# Patient Record
Sex: MALE | Race: BLACK OR AFRICAN AMERICAN | NOT HISPANIC OR LATINO | Employment: UNEMPLOYED | URBAN - METROPOLITAN AREA
[De-identification: names, ages, dates, MRNs, and addresses within clinical notes are randomized per-mention and may not be internally consistent; named-entity substitution may affect disease eponyms.]

---

## 2020-08-14 ENCOUNTER — HOSPITAL ENCOUNTER (EMERGENCY)
Facility: HOSPITAL | Age: 35
Discharge: HOME | End: 2020-08-14
Attending: EMERGENCY MEDICINE

## 2020-08-14 VITALS
OXYGEN SATURATION: 98 % | WEIGHT: 265 LBS | BODY MASS INDEX: 40.16 KG/M2 | HEIGHT: 68 IN | SYSTOLIC BLOOD PRESSURE: 149 MMHG | TEMPERATURE: 97.9 F | DIASTOLIC BLOOD PRESSURE: 114 MMHG | HEART RATE: 81 BPM | RESPIRATION RATE: 18 BRPM

## 2020-08-14 DIAGNOSIS — B30.9 ACUTE VIRAL CONJUNCTIVITIS OF BOTH EYES: Primary | ICD-10-CM

## 2020-08-14 PROCEDURE — 99284 EMERGENCY DEPT VISIT MOD MDM: CPT | Performed by: PHYSICIAN ASSISTANT

## 2020-08-14 PROCEDURE — 99282 EMERGENCY DEPT VISIT SF MDM: CPT

## 2020-08-14 RX ORDER — OFLOXACIN 3 MG/ML
1 SOLUTION/ DROPS OPHTHALMIC 4 TIMES DAILY
Qty: 1.4 ML | Refills: 0 | Status: SHIPPED | OUTPATIENT
Start: 2020-08-14 | End: 2020-08-21

## 2020-08-14 RX ORDER — KETOROLAC TROMETHAMINE 5 MG/ML
1 SOLUTION OPHTHALMIC 4 TIMES DAILY PRN
Qty: 3 ML | Refills: 0 | Status: SHIPPED | OUTPATIENT
Start: 2020-08-14 | End: 2020-08-19

## 2020-08-15 NOTE — ED NOTES
D/c reviewed with pt  Pt verbalized understanding and has no further questions at this time  Pt ambulatory off unit with steady gait       Aleksandr Client, ELLEN  08/14/20 2701

## 2020-08-15 NOTE — ED PROVIDER NOTES
History  Chief Complaint   Patient presents with    Eye Redness     x 2 days; pt reports having stye on left eye that eventually turned to full eye redness and itching     39-year-old male comes in today for evaluation of bilateral eye redness and swelling that started yesterday  Reports that started with a stye on his left medial upper eyelid and then it progressed to bilateral eye redness and itchiness  He reports discharge from left greater than right eye  He does not wear contacts  Does not have an eye doctor  None       History reviewed  No pertinent past medical history  History reviewed  No pertinent surgical history  History reviewed  No pertinent family history  I have reviewed and agree with the history as documented  E-Cigarette/Vaping     E-Cigarette/Vaping Substances     Social History     Tobacco Use    Smoking status: Current Every Day Smoker     Types: Cigarettes    Smokeless tobacco: Current User   Substance Use Topics    Alcohol use: Not on file    Drug use: Not on file       Review of Systems   Constitutional: Negative for fatigue and fever  HENT: Negative for ear pain, rhinorrhea and sore throat  Eyes: Positive for discharge, redness and itching  Negative for photophobia, pain and visual disturbance  Respiratory: Negative for cough and shortness of breath  Cardiovascular: Negative for chest pain  Gastrointestinal: Negative for abdominal pain, diarrhea, nausea and vomiting  Musculoskeletal: Negative for back pain  Neurological: Negative for headaches  Physical Exam  Physical Exam  Constitutional:       Appearance: He is well-developed  HENT:      Head: Normocephalic and atraumatic  Eyes:      General: Vision grossly intact  Left eye: Discharge present  Conjunctiva/sclera:      Right eye: Right conjunctiva is not injected  Left eye: Left conjunctiva is injected        Comments: Bilateral conjunctiva are erythematous, the medial canthus bilaterally are mildly swollen  There is watery yellow drainage from left eye  The left medial upper lid is more swollen and erythematous than the rest of the lid   Neck:      Musculoskeletal: Normal range of motion  Pulmonary:      Effort: Pulmonary effort is normal    Musculoskeletal: Normal range of motion  Skin:     General: Skin is warm and dry  Neurological:      Mental Status: He is alert and oriented to person, place, and time  Psychiatric:         Behavior: Behavior normal          Vital Signs  ED Triage Vitals [08/14/20 2120]   Temperature Pulse Respirations Blood Pressure SpO2   97 9 °F (36 6 °C) 81 18 (!) 149/114 98 %      Temp Source Heart Rate Source Patient Position - Orthostatic VS BP Location FiO2 (%)   Tympanic Monitor Sitting Left arm --      Pain Score       No Pain           Vitals:    08/14/20 2120   BP: (!) 149/114   Pulse: 81   Patient Position - Orthostatic VS: Sitting         Visual Acuity      ED Medications  Medications - No data to display    Diagnostic Studies  Results Reviewed     None                 No orders to display              Procedures  Procedures         ED Course                                             MDM      Disposition  Final diagnoses:   Acute viral conjunctivitis of both eyes     Time reflects when diagnosis was documented in both MDM as applicable and the Disposition within this note     Time User Action Codes Description Comment    8/14/2020  9:38 PM Ez Bryant Add [B30 9] Acute viral conjunctivitis of both eyes       ED Disposition     ED Disposition Condition Date/Time Comment    Discharge Stable Fri Aug 14, 2020  9:38 PM [facility] discharge to home/self care              Follow-up Information     Follow up With Specialties Details Why Contact Info    Houston Ortiz MD Ophthalmology Schedule an appointment as soon as possible for a visit   [place] [id number] Tai Munoz MD Ophthalmology Schedule an appointment as soon as possible for a visit   Sharon Pelletier [address]  [address] [id number] [phone]            Discharge Medication List as of 8/14/2020  9:42 PM      START taking these medications    Details   ketorolac (ACULAR) 0 5 % ophthalmic solution Administer 1 drop to both eyes 4 (four) times a day as needed (itchiness) for up to 5 days, Starting Fri 8/14/2020, Until Wed 8/19/2020, Print      ofloxacin (OCUFLOX) 0 3 % ophthalmic solution Administer 1 drop to both eyes 4 (four) times a day for 7 days, Starting Fri 8/14/2020, Until Fri 8/21/2020, Print           No discharge procedures on file      PDMP Review     None          ED Provider  Electronically Signed by           Beto Marsh PA-C  08/14/20 8333

## 2023-10-30 ENCOUNTER — HOSPITAL ENCOUNTER (EMERGENCY)
Facility: HOSPITAL | Age: 38
Discharge: HOME | End: 2023-10-30
Attending: EMERGENCY MEDICINE
Payer: COMMERCIAL

## 2023-10-30 VITALS
TEMPERATURE: 98.6 F | SYSTOLIC BLOOD PRESSURE: 178 MMHG | RESPIRATION RATE: 18 BRPM | HEART RATE: 97 BPM | DIASTOLIC BLOOD PRESSURE: 117 MMHG

## 2023-10-30 DIAGNOSIS — M54.9 MUSCULOSKELETAL BACK PAIN: Primary | ICD-10-CM

## 2023-10-30 PROCEDURE — 99282 EMERGENCY DEPT VISIT SF MDM: CPT

## 2023-10-30 PROCEDURE — 99284 EMERGENCY DEPT VISIT MOD MDM: CPT | Performed by: EMERGENCY MEDICINE

## 2023-10-30 PROCEDURE — 96372 THER/PROPH/DIAG INJ SC/IM: CPT

## 2023-10-30 RX ORDER — KETOROLAC TROMETHAMINE 30 MG/ML
15 INJECTION, SOLUTION INTRAMUSCULAR; INTRAVENOUS ONCE
Status: COMPLETED | OUTPATIENT
Start: 2023-10-30 | End: 2023-10-30

## 2023-10-30 RX ORDER — ACETAMINOPHEN 325 MG/1
975 TABLET ORAL ONCE
Status: COMPLETED | OUTPATIENT
Start: 2023-10-30 | End: 2023-10-30

## 2023-10-30 RX ORDER — NAPROXEN 500 MG/1
500 TABLET ORAL 2 TIMES DAILY WITH MEALS
Qty: 30 TABLET | Refills: 0 | Status: SHIPPED | OUTPATIENT
Start: 2023-10-30

## 2023-10-30 RX ORDER — LIDOCAINE 50 MG/G
1 PATCH TOPICAL ONCE
Status: DISCONTINUED | OUTPATIENT
Start: 2023-10-30 | End: 2023-10-30 | Stop reason: HOSPADM

## 2023-10-30 RX ADMIN — ACETAMINOPHEN 975 MG: 325 TABLET, FILM COATED ORAL at 12:56

## 2023-10-30 RX ADMIN — LIDOCAINE 1 PATCH: 50 PATCH CUTANEOUS at 12:55

## 2023-10-30 RX ADMIN — KETOROLAC TROMETHAMINE 15 MG: 30 INJECTION, SOLUTION INTRAMUSCULAR; INTRAVENOUS at 12:55

## 2023-10-30 NOTE — ED PROVIDER NOTES
History  Chief Complaint   Patient presents with    Back Pain     Lower back pain that radiates down legs since yesterday     HPI    Patient is a 43year old male with no significant PMHx, presenting to the ED for evaluation of lower back pain. Patient states that the back pain started 2 days ago, after he was moving a heavy bed. Patient did not sustain direct trauma to the back. He denies hearing a pop or snap. Patient states that he has since had pain across the lower back region, described as sharp, worse with bending movements. Patient states he has not tried anything for pain. Patient denies fevers, chills, saddle anesthesia, difficulty ambulating, urinary or bowel incontinence, radiating pain down the extremities, IVDA. Prior to Admission Medications   Prescriptions Last Dose Informant Patient Reported? Taking?   ketorolac (ACULAR) 0.5 % ophthalmic solution   No No   Sig: Administer 1 drop to both eyes 4 (four) times a day as needed (itchiness) for up to 5 days      Facility-Administered Medications: None       History reviewed. No pertinent past medical history. History reviewed. No pertinent surgical history. History reviewed. No pertinent family history. I have reviewed and agree with the history as documented. E-Cigarette/Vaping     E-Cigarette/Vaping Substances     Social History     Tobacco Use    Smoking status: Every Day     Types: Cigarettes    Smokeless tobacco: Current        Review of Systems   All other systems reviewed and are negative.       Physical Exam  ED Triage Vitals   Temperature Pulse Respirations Blood Pressure SpO2   10/30/23 1153 10/30/23 1153 10/30/23 1153 10/30/23 1155 --   98.6 °F (37 °C) 97 18 (!) 232/151       Temp Source Heart Rate Source Patient Position - Orthostatic VS BP Location FiO2 (%)   10/30/23 1153 10/30/23 1153 10/30/23 1314 10/30/23 1314 --   Temporal Monitor Sitting Left arm       Pain Score       10/30/23 1155       8             Orthostatic Vital Signs  Vitals:    10/30/23 1153 10/30/23 1155 10/30/23 1314   BP:  (!) 232/151 (!) 178/117   Pulse: 97     Patient Position - Orthostatic VS:   Sitting       Physical Exam  Vitals and nursing note reviewed. Constitutional:       General: He is not in acute distress. Appearance: Normal appearance. He is well-developed and normal weight. He is not ill-appearing or toxic-appearing. HENT:      Head: Normocephalic and atraumatic. Right Ear: External ear normal.      Left Ear: External ear normal.      Nose: Nose normal. No congestion. Mouth/Throat:      Mouth: Mucous membranes are moist.      Pharynx: Oropharynx is clear. Eyes:      Extraocular Movements: Extraocular movements intact. Conjunctiva/sclera: Conjunctivae normal.   Cardiovascular:      Rate and Rhythm: Normal rate and regular rhythm. Pulses: Normal pulses. Heart sounds: Normal heart sounds. No murmur heard. Pulmonary:      Effort: Pulmonary effort is normal. No respiratory distress. Breath sounds: Normal breath sounds. Abdominal:      Palpations: Abdomen is soft. Tenderness: There is no abdominal tenderness. Musculoskeletal:      Cervical back: Normal, normal range of motion and neck supple. Thoracic back: Normal.      Lumbar back: Tenderness (L and R lower lumbar area) present. No swelling, edema, deformity, spasms or bony tenderness. Normal range of motion. Skin:     General: Skin is warm and dry. Capillary Refill: Capillary refill takes less than 2 seconds. Neurological:      Mental Status: He is alert and oriented to person, place, and time. Cranial Nerves: No cranial nerve deficit. Sensory: No sensory deficit. Motor: No weakness.       Coordination: Coordination normal.      Gait: Gait normal.   Psychiatric:         Mood and Affect: Mood normal.         ED Medications  Medications   acetaminophen (TYLENOL) tablet 975 mg (975 mg Oral Given 10/30/23 1256)   ketorolac (TORADOL) injection 15 mg (15 mg Intramuscular Given 10/30/23 1255)       Diagnostic Studies  Results Reviewed       None                   No orders to display         Procedures  Procedures      ED Course       Patient is a 43year old male presenting to the ED for evaluation of lower back pain. Patient was moving a heavy bed before the pain started and believes that the pain is attributed to that. Has not tried any medications. On exam, patient has diffuse lumbar tenderness, made worse with bending movements. Ddx musculoskeletal strain, disc bulge. No suspicion for acute spinal cord dysfunction based on normal neuro examination. Will treat with Tylenol, Toradol, Lidocaine patch. Referral given for [facility]. Patient offered Robaxin, but declined. I gave oral return precautions for what to return for in addition to the written return precautions. The patient verbalized understanding of the discharge instructions and warnings that would necessitate return to the Emergency Department. I specifically highlighted areas of special concern regarding the written and verbal discharge instructions and return precautions. All questions were answered prior to discharge. Medical Decision Making  Risk  OTC drugs. Prescription drug management. Disposition  Final diagnoses:   Musculoskeletal back pain     Time reflects when diagnosis was documented in both MDM as applicable and the Disposition within this note       Time User Action Codes Description Comment    10/30/2023 12:48 PM Angus العراقي [M54.9] Musculoskeletal back pain           ED Disposition       ED Disposition   Discharge    Condition   Stable    Date/Time   Mon Oct 30, 2023 12:55 PM    Comment   Mor Fernando discharge to home/self care.                    Follow-up Information       Follow up With Specialties Details Why [address]    [phone]              Discharge Medication List as of 10/30/2023 12:56 PM        START taking these medications    Details   naproxen (Naprosyn) 500 mg tablet Take 1 tablet (500 mg total) by mouth 2 (two) times a day with meals, Starting Mon 10/30/2023, Normal           CONTINUE these medications which have NOT CHANGED    Details   ketorolac (ACULAR) 0.5 % ophthalmic solution Administer 1 drop to both eyes 4 (four) times a day as needed (itchiness) for up to 5 days, Starting Fri 8/14/2020, Until Wed 8/19/2020, Print               PDMP Review       None             ED Provider  Attending physically available and evaluated Gil Ontiveros. I managed the patient along with the ED Attending.     Electronically Signed by           Lor Jackson DO  10/30/23 8193

## 2023-10-30 NOTE — Clinical Note
Sourav Gee was seen and treated in our emergency department on 10/30/2023. No restrictions            Diagnosis: musculoskeletal back pain    Benitez  may return to work on return date. He may return on this date: 11/02/2023         If you have any questions or concerns, please don't hesitate to call.       Palomo Garrido, DO    ______________________________           _______________          _______________  Hospital Representative                              Date                                Time

## 2023-10-30 NOTE — DISCHARGE INSTRUCTIONS
Please take medications as prescribed. Follow up with [facility] as directed. Avoid heavy lifting. Gentle stretching advised. Purchase OTC Lidocaine patches. Return to the ED with any new/concerning issues.

## 2023-10-31 ENCOUNTER — TELEPHONE (OUTPATIENT)
Dept: PHYSICAL THERAPY | Facility: OTHER | Age: 38
End: 2023-10-31

## 2023-10-31 NOTE — TELEPHONE ENCOUNTER
Message left for patient regarding the referral entered for  [facility].     Contact information, hours of operation and VM instructions provided. Requested patient to CB to discuss [facility]SP services. This is first attempt to reach the patient. Referral deferred for f/u attempt per protocol.

## 2023-12-30 ENCOUNTER — HOSPITAL ENCOUNTER (INPATIENT)
Facility: HOSPITAL | Age: 38
LOS: 3 days | Discharge: HOME | DRG: 420 | End: 2024-01-02
Attending: EMERGENCY MEDICINE | Admitting: INTERNAL MEDICINE
Payer: COMMERCIAL

## 2023-12-30 DIAGNOSIS — I10 HYPERTENSION: ICD-10-CM

## 2023-12-30 DIAGNOSIS — E11.10 DKA (DIABETIC KETOACIDOSIS) (HCC): Primary | ICD-10-CM

## 2023-12-30 LAB
ALBUMIN SERPL BCP-MCNC: 4.4 G/DL (ref 3.5–5)
ALP SERPL-CCNC: 145 U/L (ref 34–104)
ALT SERPL W P-5'-P-CCNC: 28 U/L (ref 7–52)
ANION GAP SERPL CALCULATED.3IONS-SCNC: 15 MMOL/L
ANION GAP SERPL CALCULATED.3IONS-SCNC: 23 MMOL/L
ANION GAP SERPL CALCULATED.3IONS-SCNC: 7 MMOL/L
AST SERPL W P-5'-P-CCNC: 16 U/L (ref 13–39)
BACTERIA UR QL AUTO: ABNORMAL /HPF
BASE EX.OXY STD BLDV CALC-SCNC: 89.3 % (ref 60–80)
BASE EXCESS BLDV CALC-SCNC: -6.4 MMOL/L
BASOPHILS # BLD AUTO: 0.07 THOUSANDS/ÂΜL (ref 0–0.1)
BASOPHILS NFR BLD AUTO: 1 % (ref 0–1)
BETA-HYDROXYBUTYRATE: 5.8 MMOL/L
BILIRUB SERPL-MCNC: 1.02 MG/DL (ref 0.2–1)
BILIRUB UR QL STRIP: NEGATIVE
BUN SERPL-MCNC: 15 MG/DL (ref 5–25)
BUN SERPL-MCNC: 18 MG/DL (ref 5–25)
BUN SERPL-MCNC: 22 MG/DL (ref 5–25)
CALCIUM SERPL-MCNC: 10.2 MG/DL (ref 8.4–10.2)
CALCIUM SERPL-MCNC: 7.9 MG/DL (ref 8.4–10.2)
CALCIUM SERPL-MCNC: 8.4 MG/DL (ref 8.4–10.2)
CHLORIDE SERPL-SCNC: 101 MMOL/L (ref 96–108)
CHLORIDE SERPL-SCNC: 104 MMOL/L (ref 96–108)
CHLORIDE SERPL-SCNC: 92 MMOL/L (ref 96–108)
CLARITY UR: CLEAR
CO2 SERPL-SCNC: 19 MMOL/L (ref 21–32)
CO2 SERPL-SCNC: 19 MMOL/L (ref 21–32)
CO2 SERPL-SCNC: 26 MMOL/L (ref 21–32)
COLOR UR: ABNORMAL
CREAT SERPL-MCNC: 0.77 MG/DL (ref 0.6–1.3)
CREAT SERPL-MCNC: 0.85 MG/DL (ref 0.6–1.3)
CREAT SERPL-MCNC: 1.16 MG/DL (ref 0.6–1.3)
EOSINOPHIL # BLD AUTO: 0.01 THOUSAND/ÂΜL (ref 0–0.61)
EOSINOPHIL NFR BLD AUTO: 0 % (ref 0–6)
ERYTHROCYTE [DISTWIDTH] IN BLOOD BY AUTOMATED COUNT: 13.8 % (ref 11.6–15.1)
EST. AVERAGE GLUCOSE BLD GHB EST-MCNC: 367 MG/DL
FLUAV RNA RESP QL NAA+PROBE: NEGATIVE
FLUBV RNA RESP QL NAA+PROBE: NEGATIVE
GFR SERPL CREATININE-BSD FRML MDRD: 109 ML/MIN/1.73SQ M
GFR SERPL CREATININE-BSD FRML MDRD: 114 ML/MIN/1.73SQ M
GFR SERPL CREATININE-BSD FRML MDRD: 78 ML/MIN/1.73SQ M
GLUCOSE SERPL-MCNC: 159 MG/DL (ref 65–140)
GLUCOSE SERPL-MCNC: 164 MG/DL (ref 65–140)
GLUCOSE SERPL-MCNC: 173 MG/DL (ref 65–140)
GLUCOSE SERPL-MCNC: 183 MG/DL (ref 65–140)
GLUCOSE SERPL-MCNC: 185 MG/DL (ref 65–140)
GLUCOSE SERPL-MCNC: 210 MG/DL (ref 65–140)
GLUCOSE SERPL-MCNC: 261 MG/DL (ref 65–140)
GLUCOSE SERPL-MCNC: 262 MG/DL (ref 65–140)
GLUCOSE SERPL-MCNC: 282 MG/DL (ref 65–140)
GLUCOSE SERPL-MCNC: 364 MG/DL (ref 65–140)
GLUCOSE SERPL-MCNC: 468 MG/DL (ref 65–140)
GLUCOSE SERPL-MCNC: 579 MG/DL (ref 65–140)
GLUCOSE SERPL-MCNC: >500 MG/DL (ref 65–140)
GLUCOSE UR STRIP-MCNC: ABNORMAL MG/DL
GRAN CASTS #/AREA URNS LPF: ABNORMAL /[LPF]
HBA1C MFR BLD: 14.4 %
HCO3 BLDV-SCNC: 18.1 MMOL/L (ref 24–30)
HCT VFR BLD AUTO: 44.6 % (ref 36.5–49.3)
HGB BLD-MCNC: 14.5 G/DL (ref 12–17)
HGB UR QL STRIP.AUTO: ABNORMAL
HYALINE CASTS #/AREA URNS LPF: ABNORMAL /LPF
IMM GRANULOCYTES # BLD AUTO: 0.05 THOUSAND/UL (ref 0–0.2)
IMM GRANULOCYTES NFR BLD AUTO: 0 % (ref 0–2)
KETONES UR STRIP-MCNC: ABNORMAL MG/DL
LEUKOCYTE ESTERASE UR QL STRIP: ABNORMAL
LYMPHOCYTES # BLD AUTO: 1.99 THOUSANDS/ÂΜL (ref 0.6–4.47)
LYMPHOCYTES NFR BLD AUTO: 18 % (ref 14–44)
MAGNESIUM SERPL-MCNC: 1.9 MG/DL (ref 1.9–2.7)
MAGNESIUM SERPL-MCNC: 1.9 MG/DL (ref 1.9–2.7)
MAGNESIUM SERPL-MCNC: 2.1 MG/DL (ref 1.9–2.7)
MCH RBC QN AUTO: 28.2 PG (ref 26.8–34.3)
MCHC RBC AUTO-ENTMCNC: 32.5 G/DL (ref 31.4–37.4)
MCV RBC AUTO: 87 FL (ref 82–98)
MONOCYTES # BLD AUTO: 0.55 THOUSAND/ÂΜL (ref 0.17–1.22)
MONOCYTES NFR BLD AUTO: 5 % (ref 4–12)
MUCOUS THREADS UR QL AUTO: ABNORMAL
NEUTROPHILS # BLD AUTO: 8.72 THOUSANDS/ÂΜL (ref 1.85–7.62)
NEUTS SEG NFR BLD AUTO: 76 % (ref 43–75)
NITRITE UR QL STRIP: NEGATIVE
NON-SQ EPI CELLS URNS QL MICRO: ABNORMAL /HPF
NRBC BLD AUTO-RTO: 0 /100 WBCS
O2 CT BLDV-SCNC: 19.8 ML/DL
PCO2 BLDV: 33.7 MM HG (ref 42–50)
PH BLDV: 7.35 [PH] (ref 7.3–7.4)
PH UR STRIP.AUTO: 5 [PH]
PHOSPHATE SERPL-MCNC: 2.7 MG/DL (ref 2.7–4.5)
PHOSPHATE SERPL-MCNC: 3.6 MG/DL (ref 2.7–4.5)
PHOSPHATE SERPL-MCNC: 6.6 MG/DL (ref 2.7–4.5)
PO2 BLDV: 67.4 MM HG (ref 35–45)
POTASSIUM SERPL-SCNC: 3.5 MMOL/L (ref 3.5–5.3)
POTASSIUM SERPL-SCNC: 3.7 MMOL/L (ref 3.5–5.3)
POTASSIUM SERPL-SCNC: 4.8 MMOL/L (ref 3.5–5.3)
PROT SERPL-MCNC: 7.8 G/DL (ref 6.4–8.4)
PROT UR STRIP-MCNC: ABNORMAL MG/DL
RBC # BLD AUTO: 5.15 MILLION/UL (ref 3.88–5.62)
RBC #/AREA URNS AUTO: ABNORMAL /HPF
RSV RNA RESP QL NAA+PROBE: POSITIVE
SARS-COV-2 RNA RESP QL NAA+PROBE: NEGATIVE
SODIUM SERPL-SCNC: 134 MMOL/L (ref 135–147)
SODIUM SERPL-SCNC: 135 MMOL/L (ref 135–147)
SODIUM SERPL-SCNC: 137 MMOL/L (ref 135–147)
SP GR UR STRIP.AUTO: 1.03 (ref 1–1.03)
UROBILINOGEN UR STRIP-ACNC: <2 MG/DL
WBC # BLD AUTO: 11.39 THOUSAND/UL (ref 4.31–10.16)
WBC #/AREA URNS AUTO: ABNORMAL /HPF

## 2023-12-30 PROCEDURE — 82948 REAGENT STRIP/BLOOD GLUCOSE: CPT

## 2023-12-30 PROCEDURE — 85025 COMPLETE CBC W/AUTO DIFF WBC: CPT

## 2023-12-30 PROCEDURE — 83036 HEMOGLOBIN GLYCOSYLATED A1C: CPT | Performed by: STUDENT IN AN ORGANIZED HEALTH CARE EDUCATION/TRAINING PROGRAM

## 2023-12-30 PROCEDURE — 99284 EMERGENCY DEPT VISIT MOD MDM: CPT

## 2023-12-30 PROCEDURE — 84100 ASSAY OF PHOSPHORUS: CPT | Performed by: STUDENT IN AN ORGANIZED HEALTH CARE EDUCATION/TRAINING PROGRAM

## 2023-12-30 PROCEDURE — 82805 BLOOD GASES W/O2 SATURATION: CPT

## 2023-12-30 PROCEDURE — 93005 ELECTROCARDIOGRAM TRACING: CPT

## 2023-12-30 PROCEDURE — 83735 ASSAY OF MAGNESIUM: CPT | Performed by: INTERNAL MEDICINE

## 2023-12-30 PROCEDURE — 51798 US URINE CAPACITY MEASURE: CPT

## 2023-12-30 PROCEDURE — 96375 TX/PRO/DX INJ NEW DRUG ADDON: CPT

## 2023-12-30 PROCEDURE — 96365 THER/PROPH/DIAG IV INF INIT: CPT

## 2023-12-30 PROCEDURE — 80048 BASIC METABOLIC PNL TOTAL CA: CPT | Performed by: STUDENT IN AN ORGANIZED HEALTH CARE EDUCATION/TRAINING PROGRAM

## 2023-12-30 PROCEDURE — 83735 ASSAY OF MAGNESIUM: CPT

## 2023-12-30 PROCEDURE — 84100 ASSAY OF PHOSPHORUS: CPT | Performed by: INTERNAL MEDICINE

## 2023-12-30 PROCEDURE — 81001 URINALYSIS AUTO W/SCOPE: CPT

## 2023-12-30 PROCEDURE — 83735 ASSAY OF MAGNESIUM: CPT | Performed by: STUDENT IN AN ORGANIZED HEALTH CARE EDUCATION/TRAINING PROGRAM

## 2023-12-30 PROCEDURE — 80053 COMPREHEN METABOLIC PANEL: CPT

## 2023-12-30 PROCEDURE — 99291 CRITICAL CARE FIRST HOUR: CPT | Performed by: EMERGENCY MEDICINE

## 2023-12-30 PROCEDURE — 99223 1ST HOSP IP/OBS HIGH 75: CPT | Performed by: INTERNAL MEDICINE

## 2023-12-30 PROCEDURE — 84100 ASSAY OF PHOSPHORUS: CPT

## 2023-12-30 PROCEDURE — 36415 COLL VENOUS BLD VENIPUNCTURE: CPT

## 2023-12-30 PROCEDURE — 0241U HB NFCT DS VIR RESP RNA 4 TRGT: CPT

## 2023-12-30 PROCEDURE — 82010 KETONE BODYS QUAN: CPT

## 2023-12-30 PROCEDURE — 80048 BASIC METABOLIC PNL TOTAL CA: CPT | Performed by: INTERNAL MEDICINE

## 2023-12-30 RX ORDER — ACETAMINOPHEN 325 MG/1
650 TABLET ORAL EVERY 6 HOURS PRN
Status: DISCONTINUED | OUTPATIENT
Start: 2023-12-30 | End: 2024-01-02 | Stop reason: HOSPADM

## 2023-12-30 RX ORDER — ENOXAPARIN SODIUM 100 MG/ML
40 INJECTION SUBCUTANEOUS DAILY
Status: DISCONTINUED | OUTPATIENT
Start: 2023-12-30 | End: 2024-01-02 | Stop reason: HOSPADM

## 2023-12-30 RX ORDER — SODIUM CHLORIDE, SODIUM GLUCONATE, SODIUM ACETATE, POTASSIUM CHLORIDE, MAGNESIUM CHLORIDE, SODIUM PHOSPHATE, DIBASIC, AND POTASSIUM PHOSPHATE .53; .5; .37; .037; .03; .012; .00082 G/100ML; G/100ML; G/100ML; G/100ML; G/100ML; G/100ML; G/100ML
1000 INJECTION, SOLUTION INTRAVENOUS ONCE
Status: COMPLETED | OUTPATIENT
Start: 2023-12-30 | End: 2023-12-30

## 2023-12-30 RX ORDER — CHLORHEXIDINE GLUCONATE ORAL RINSE 1.2 MG/ML
15 SOLUTION DENTAL EVERY 12 HOURS SCHEDULED
Status: DISCONTINUED | OUTPATIENT
Start: 2023-12-30 | End: 2024-01-02

## 2023-12-30 RX ORDER — POTASSIUM CHLORIDE 20 MEQ/1
40 TABLET, EXTENDED RELEASE ORAL ONCE
Status: COMPLETED | OUTPATIENT
Start: 2023-12-30 | End: 2023-12-30

## 2023-12-30 RX ORDER — DEXTROSE, SODIUM CHLORIDE, SODIUM LACTATE, POTASSIUM CHLORIDE, AND CALCIUM CHLORIDE 5; .6; .31; .03; .02 G/100ML; G/100ML; G/100ML; G/100ML; G/100ML
100 INJECTION, SOLUTION INTRAVENOUS CONTINUOUS
Status: DISCONTINUED | OUTPATIENT
Start: 2023-12-30 | End: 2023-12-31

## 2023-12-30 RX ORDER — SODIUM CHLORIDE, SODIUM GLUCONATE, SODIUM ACETATE, POTASSIUM CHLORIDE, MAGNESIUM CHLORIDE, SODIUM PHOSPHATE, DIBASIC, AND POTASSIUM PHOSPHATE .53; .5; .37; .037; .03; .012; .00082 G/100ML; G/100ML; G/100ML; G/100ML; G/100ML; G/100ML; G/100ML
250 INJECTION, SOLUTION INTRAVENOUS CONTINUOUS
Status: DISCONTINUED | OUTPATIENT
Start: 2023-12-30 | End: 2023-12-30

## 2023-12-30 RX ORDER — LANOLIN ALCOHOL/MO/W.PET/CERES
800 CREAM (GRAM) TOPICAL ONCE
Status: COMPLETED | OUTPATIENT
Start: 2023-12-30 | End: 2023-12-30

## 2023-12-30 RX ADMIN — SODIUM CHLORIDE, SODIUM GLUCONATE, SODIUM ACETATE, POTASSIUM CHLORIDE, MAGNESIUM CHLORIDE, SODIUM PHOSPHATE, DIBASIC, AND POTASSIUM PHOSPHATE 250 ML/HR: .53; .5; .37; .037; .03; .012; .00082 INJECTION, SOLUTION INTRAVENOUS at 15:50

## 2023-12-30 RX ADMIN — SODIUM CHLORIDE 18 UNITS/HR: 9 INJECTION, SOLUTION INTRAVENOUS at 13:34

## 2023-12-30 RX ADMIN — POTASSIUM CHLORIDE 40 MEQ: 1500 TABLET, EXTENDED RELEASE ORAL at 22:04

## 2023-12-30 RX ADMIN — SODIUM CHLORIDE, SODIUM GLUCONATE, SODIUM ACETATE, POTASSIUM CHLORIDE, MAGNESIUM CHLORIDE, SODIUM PHOSPHATE, DIBASIC, AND POTASSIUM PHOSPHATE 250 ML/HR: .53; .5; .37; .037; .03; .012; .00082 INJECTION, SOLUTION INTRAVENOUS at 20:02

## 2023-12-30 RX ADMIN — MAGNESIUM OXIDE TAB 400 MG (241.3 MG ELEMENTAL MG) 800 MG: 400 (241.3 MG) TAB at 22:04

## 2023-12-30 RX ADMIN — SODIUM CHLORIDE, SODIUM GLUCONATE, SODIUM ACETATE, POTASSIUM CHLORIDE, MAGNESIUM CHLORIDE, SODIUM PHOSPHATE, DIBASIC, AND POTASSIUM PHOSPHATE 1000 ML: .53; .5; .37; .037; .03; .012; .00082 INJECTION, SOLUTION INTRAVENOUS at 14:27

## 2023-12-30 RX ADMIN — CHLORHEXIDINE GLUCONATE 15 ML: 1.2 SOLUTION ORAL at 16:51

## 2023-12-30 RX ADMIN — SODIUM CHLORIDE, SODIUM GLUCONATE, SODIUM ACETATE, POTASSIUM CHLORIDE, MAGNESIUM CHLORIDE, SODIUM PHOSPHATE, DIBASIC, AND POTASSIUM PHOSPHATE 1000 ML: .53; .5; .37; .037; .03; .012; .00082 INJECTION, SOLUTION INTRAVENOUS at 12:03

## 2023-12-30 RX ADMIN — SODIUM CHLORIDE, SODIUM GLUCONATE, SODIUM ACETATE, POTASSIUM CHLORIDE, MAGNESIUM CHLORIDE, SODIUM PHOSPHATE, DIBASIC, AND POTASSIUM PHOSPHATE 1000 ML: .53; .5; .37; .037; .03; .012; .00082 INJECTION, SOLUTION INTRAVENOUS at 12:02

## 2023-12-30 RX ADMIN — SODIUM CHLORIDE 12 UNITS/HR: 9 INJECTION, SOLUTION INTRAVENOUS at 19:04

## 2023-12-30 RX ADMIN — DEXTROSE, SODIUM CHLORIDE, SODIUM LACTATE, POTASSIUM CHLORIDE, AND CALCIUM CHLORIDE 250 ML/HR: 5; .6; .31; .03; .02 INJECTION, SOLUTION INTRAVENOUS at 17:56

## 2023-12-30 NOTE — ED ATTENDING ATTESTATION
12/30/2023  I, Luis Sanchez MD, saw and evaluated the patient. I have discussed the patient with the resident/non-physician practitioner and agree with the resident's/non-physician practitioner's findings, Plan of Care, and MDM as documented in the resident's/non-physician practitioner's note, except where noted. All available labs and Radiology studies were reviewed.  I was present for key portions of any procedure(s) performed by the resident/non-physician practitioner and I was immediately available to provide assistance.       At this point I agree with the current assessment done in the Emergency Department.  I have conducted an independent evaluation of this patient a history and physical is as follows:  Here with  thirst  and poly uria   Feels bloated  and weak     No known dm   Exam   NAD   anicteric    dry mm   Neck supple   lungs clear heart rrr no m abd soft nt nd pos bs  ext normal   Imp   new onset  DM   r/o DKA    ED Course   Labs consistent with mild DKA new onset diabetes  Bicarb is 19 anion gap is 23   pH 7.34  pco2 33  Blood sugar greater than 500  IV fluid bolus x 2 L  IV insulin infusion initiated  Critical Care Time  Procedures    The patient presented with a condition in which there was a high probability of imminent or life-threatening deterioration, and critical care services (excluding separately billable procedures and total teaching time ) total 35  minutes

## 2023-12-30 NOTE — LETTER
[facility] [address]  [address]  [place] [address]  Dept: [phone]    January 2, 2024     Patient: Benitez White   YOB: 1985   Date of Visit: 12/30/2023       To Whom it May Concern:    Benitez White is under my professional care. He was seen in the hospital from 12/30/2023 to 01/02/24. He may return to work on 1/6/2024 with the following limitations - time for blood glucose checks and insulin administration.    If you have any questions or concerns, please don't hesitate to call.         Sincerely,          Eileen Archer MD

## 2023-12-30 NOTE — H&P
"H&P Exam - Critical Care   Benitez White 38 y.o. male MRN: [id number]  Unit/Bed#: [facility] Encounter: [id number]      -------------------------------------------------------------------------------------------------------------  Chief Complaint: Fatigue    History of Present Illness   HX and PE limited by: N/A  Benitez White is a 38 y.o. male without significant PMHx who presents with 1 week of worsening general weakness. He also complains of urinary frequency, increased thirst, and decreased appetite. Pt states they symptoms have been progressively worsening over the past week, he can no longer tolerated the urinary frequency. He has been urinating ~ every hour. No recent illness. No family history of DM. Pt smoked 1 pack of cigarettes per day for \"most of his life,\" quit smoking and vaping ~ 3 months ago. Does not drink alcohol daily.     History obtained from the patient.  -------------------------------------------------------------------------------------------------------------  Assessment and Plan:    Neuro:   Diagnosis: No active issues     CV:   Diagnosis: No active issues       Pulm:  Diagnosis: No active issues       GI:   Diagnosis: No active issues       :   Diagnosis: Increased urinary frequency, 2/2 DKA   UA with elevated glucose and ketones, nitrite/leukocyte neg  Treat DKA       F/E/N:   F: 3L isolyte, will transition to D5 LR when BS below 250   E: q 4 BMP, replete K<4, Mag <2, Phos <3   N: NPO       Heme/Onc:   Diagnosis: DVT px  Plan: Lovenox       Endo:   Diagnosis: DKA, b-hydroxybutyrate 5.8  Plan: s/p fluid boluses  Insulin drip  Replete lytes  d5LR when BS < 250  Endocrine consult: new diabetic, DKA     ID:   Diagnosis: No active issues       MSK/Skin:   Diagnosis: No active issues   Plan: OOB         Disposition: Admit to Stepdown Level 1  Code Status: Level 1 - Full Code  --------------------------------------------------------------------------------------------------------------  Review of " Systems   Constitutional:  Positive for appetite change and fatigue.   Endocrine: Positive for polydipsia and polyuria.   Genitourinary:  Positive for frequency.   All other systems reviewed and are negative.      A 12-point, complete review of systems was reviewed and negative except as stated above     Physical Exam  Vitals reviewed.   Constitutional:       General: He is not in acute distress.     Appearance: Normal appearance. He is obese.   HENT:      Head: Normocephalic and atraumatic.   Eyes:      Extraocular Movements: Extraocular movements intact.      Conjunctiva/sclera: Conjunctivae normal.      Pupils: Pupils are equal, round, and reactive to light.   Cardiovascular:      Rate and Rhythm: Normal rate and regular rhythm.      Pulses: Normal pulses.      Heart sounds: Normal heart sounds.   Pulmonary:      Effort: Pulmonary effort is normal.      Breath sounds: Normal breath sounds.   Abdominal:      Palpations: Abdomen is soft.      Tenderness: There is no abdominal tenderness.   Musculoskeletal:         General: Normal range of motion.      Cervical back: Normal range of motion.   Skin:     General: Skin is warm and dry.      Capillary Refill: Capillary refill takes less than 2 seconds.   Neurological:      General: No focal deficit present.      Mental Status: He is alert and oriented to person, place, and time.         --------------------------------------------------------------------------------------------------------------  Vitals:   Vitals:    12/30/23 1029 12/30/23 1237 12/30/23 1345   BP: (!) 174/103 148/88 (!) 180/89   BP Location: Left arm Left arm Left arm   Pulse: 99 79 70   Resp: 17 19 20   Temp: 97.6 °F (36.4 °C)     TempSrc: Oral     SpO2: 100% 100% 100%     Temp  Min: 97.6 °F (36.4 °C)  Max: 97.6 °F (36.4 °C)        There is no height or weight on file to calculate BMI.  N/A    Laboratory and Diagnostics:  Results from last 7 days   Lab Units 12/30/23  1137   WBC Thousand/uL 11.39*    HEMOGLOBIN g/dL 14.5   HEMATOCRIT % 44.6   NEUTROS PCT % 76*   MONOS PCT % 5   EOS PCT % 0     Results from last 7 days   Lab Units 12/30/23  1148   SODIUM mmol/L 134*   POTASSIUM mmol/L 4.8   CHLORIDE mmol/L 92*   CO2 mmol/L 19*   ANION GAP mmol/L 23   BUN mg/dL 22   CREATININE mg/dL 1.16   CALCIUM mg/dL 10.2   GLUCOSE RANDOM mg/dL 579*   ALT U/L 28   AST U/L 16   ALK PHOS U/L 145*   ALBUMIN g/dL 4.4   TOTAL BILIRUBIN mg/dL 1.02*     Results from last 7 days   Lab Units 12/30/23  1148   MAGNESIUM mg/dL 2.1   PHOSPHORUS mg/dL 6.6*                   ABG:    VBG:  Results from last 7 days   Lab Units 12/30/23  1148   PH SULAIMAN  7.348   PCO2 SULAIMAN mm Hg 33.7*   PO2 SULAIMAN mm Hg 67.4*   HCO3 SULAIMAN mmol/L 18.1*   BASE EXC SULAIMAN mmol/L -6.4           Micro:        EKG: No EKG  Imaging: No new imaging      Historical Information   History reviewed. No pertinent past medical history.  History reviewed. No pertinent surgical history.  Social History   Social History     Substance and Sexual Activity   Alcohol Use Not Currently     Social History     Substance and Sexual Activity   Drug Use Not Currently    Types: Marijuana     Social History     Tobacco Use   Smoking Status Every Day    Types: Cigarettes   Smokeless Tobacco Current     Exercise History:   Family History:   History reviewed. No pertinent family history.  I have reviewed this patient's family history and commented on sigificant items within the HPI      Medications:  Current Facility-Administered Medications   Medication Dose Route Frequency    chlorhexidine (PERIDEX) 0.12 % oral rinse 15 mL  15 mL Mouth/Throat Q12H [facility]    dextrose 5 % in lactated Ringer's infusion  250 mL/hr Intravenous Continuous    enoxaparin (LOVENOX) subcutaneous injection 40 mg  40 mg Subcutaneous Daily    insulin regular (HumuLIN R,NovoLIN R) 1 Units/mL in sodium chloride 0.9 % 100 mL infusion  0.3-21 Units/hr Intravenous Titrated    multi-electrolyte (ISOLYTE-S PH 7.4) bolus 1,000 mL  1,000 mL  "Intravenous Once    multi-electrolyte (PLASMALYTE-A/ISOLYTE-S PH 7.4) IV solution  250 mL/hr Intravenous Continuous     Home medications:  Prior to Admission Medications   Prescriptions Last Dose Informant Patient Reported? Taking?   ketorolac (ACULAR) 0.5 % ophthalmic solution   No No   Sig: Administer 1 drop to both eyes 4 (four) times a day as needed (itchiness) for up to 5 days   naproxen (Naprosyn) 500 mg tablet   No No   Sig: Take 1 tablet (500 mg total) by mouth 2 (two) times a day with meals      Facility-Administered Medications: None     Allergies:  Allergies   Allergen Reactions    Nuts - Food Allergy      Cashews     ------------------------------------------------------------------------------------------------------------  Advance Directive and Living Will:      Power of :    POLST:    ------------------------------------------------------------------------------------------------------------  Anticipated Length of Stay is > 2 midnights    Care Time Delivered: 40 min    Jarek Hadley MD        Portions of the record may have been created with voice recognition software.  Occasional wrong word or \"sound a like\" substitutions may have occurred due to the inherent limitations of voice recognition software.  Read the chart carefully and recognize, using context, where substitutions have occurred   "

## 2023-12-31 ENCOUNTER — APPOINTMENT (INPATIENT)
Dept: NON INVASIVE DIAGNOSTICS | Facility: HOSPITAL | Age: 38
DRG: 420 | End: 2023-12-31
Payer: COMMERCIAL

## 2023-12-31 PROBLEM — E87.29 INCREASED ANION GAP METABOLIC ACIDOSIS: Status: ACTIVE | Noted: 2023-12-31

## 2023-12-31 PROBLEM — I10 HYPERTENSION: Status: ACTIVE | Noted: 2023-12-31

## 2023-12-31 PROBLEM — E11.10 DKA (DIABETIC KETOACIDOSIS) (HCC): Status: ACTIVE | Noted: 2023-12-31

## 2023-12-31 PROBLEM — B33.8 RSV (RESPIRATORY SYNCYTIAL VIRUS INFECTION): Status: ACTIVE | Noted: 2023-12-31

## 2023-12-31 LAB
2HR DELTA HS TROPONIN: -1 NG/L
4HR DELTA HS TROPONIN: -5 NG/L
ALBUMIN SERPL BCP-MCNC: 3.2 G/DL (ref 3.5–5)
ALP SERPL-CCNC: 85 U/L (ref 34–104)
ALT SERPL W P-5'-P-CCNC: 20 U/L (ref 7–52)
ANION GAP SERPL CALCULATED.3IONS-SCNC: 7 MMOL/L
ANION GAP SERPL CALCULATED.3IONS-SCNC: 8 MMOL/L
AORTIC ROOT: 3.3 CM
APICAL FOUR CHAMBER EJECTION FRACTION: 62 %
ASCENDING AORTA: 2.9 CM
AST SERPL W P-5'-P-CCNC: 19 U/L (ref 13–39)
BILIRUB SERPL-MCNC: 1.11 MG/DL (ref 0.2–1)
BUN SERPL-MCNC: 13 MG/DL (ref 5–25)
BUN SERPL-MCNC: 13 MG/DL (ref 5–25)
CALCIUM ALBUM COR SERPL-MCNC: 9.1 MG/DL (ref 8.3–10.1)
CALCIUM SERPL-MCNC: 8.3 MG/DL (ref 8.4–10.2)
CALCIUM SERPL-MCNC: 8.5 MG/DL (ref 8.4–10.2)
CARDIAC TROPONIN I PNL SERPL HS: 48 NG/L
CARDIAC TROPONIN I PNL SERPL HS: 52 NG/L
CARDIAC TROPONIN I PNL SERPL HS: 53 NG/L
CHLORIDE SERPL-SCNC: 103 MMOL/L (ref 96–108)
CHLORIDE SERPL-SCNC: 104 MMOL/L (ref 96–108)
CHOLEST SERPL-MCNC: 102 MG/DL
CO2 SERPL-SCNC: 25 MMOL/L (ref 21–32)
CO2 SERPL-SCNC: 26 MMOL/L (ref 21–32)
CREAT SERPL-MCNC: 0.72 MG/DL (ref 0.6–1.3)
CREAT SERPL-MCNC: 0.77 MG/DL (ref 0.6–1.3)
E WAVE DECELERATION TIME: 201 MS
E/A RATIO: 1.1
ERYTHROCYTE [DISTWIDTH] IN BLOOD BY AUTOMATED COUNT: 13.8 % (ref 11.6–15.1)
FRACTIONAL SHORTENING: 28 (ref 28–44)
GFR SERPL CREATININE-BSD FRML MDRD: 114 ML/MIN/1.73SQ M
GFR SERPL CREATININE-BSD FRML MDRD: 117 ML/MIN/1.73SQ M
GLUCOSE SERPL-MCNC: 101 MG/DL (ref 65–140)
GLUCOSE SERPL-MCNC: 116 MG/DL (ref 65–140)
GLUCOSE SERPL-MCNC: 127 MG/DL (ref 65–140)
GLUCOSE SERPL-MCNC: 137 MG/DL (ref 65–140)
GLUCOSE SERPL-MCNC: 138 MG/DL (ref 65–140)
GLUCOSE SERPL-MCNC: 174 MG/DL (ref 65–140)
GLUCOSE SERPL-MCNC: 190 MG/DL (ref 65–140)
GLUCOSE SERPL-MCNC: 202 MG/DL (ref 65–140)
GLUCOSE SERPL-MCNC: 209 MG/DL (ref 65–140)
GLUCOSE SERPL-MCNC: 218 MG/DL (ref 65–140)
GLUCOSE SERPL-MCNC: 218 MG/DL (ref 65–140)
GLUCOSE SERPL-MCNC: 227 MG/DL (ref 65–140)
HCT VFR BLD AUTO: 34.8 % (ref 36.5–49.3)
HDLC SERPL-MCNC: 26 MG/DL
HGB BLD-MCNC: 11.5 G/DL (ref 12–17)
INTERVENTRICULAR SEPTUM IN DIASTOLE (PARASTERNAL SHORT AXIS VIEW): 1.3 CM
INTERVENTRICULAR SEPTUM: 1.3 CM (ref 0.6–1.1)
LAAS-AP2: 18 CM2
LAAS-AP4: 17.1 CM2
LDLC SERPL CALC-MCNC: 58 MG/DL (ref 0–100)
LEFT ATRIUM SIZE: 3.6 CM
LEFT ATRIUM VOLUME (MOD BIPLANE): 49 ML
LEFT ATRIUM VOLUME INDEX (MOD BIPLANE): 22.6 ML/M2
LEFT INTERNAL DIMENSION IN SYSTOLE: 3.3 CM (ref 2.1–4)
LEFT VENTRICULAR INTERNAL DIMENSION IN DIASTOLE: 4.6 CM (ref 3.5–6)
LEFT VENTRICULAR POSTERIOR WALL IN END DIASTOLE: 1.3 CM
LEFT VENTRICULAR STROKE VOLUME: 55 ML
LVSV (TEICH): 55 ML
MAGNESIUM SERPL-MCNC: 1.8 MG/DL (ref 1.9–2.7)
MAGNESIUM SERPL-MCNC: 1.9 MG/DL (ref 1.9–2.7)
MCH RBC QN AUTO: 28.8 PG (ref 26.8–34.3)
MCHC RBC AUTO-ENTMCNC: 33 G/DL (ref 31.4–37.4)
MCV RBC AUTO: 87 FL (ref 82–98)
MV E'TISSUE VEL-SEP: 6 CM/S
MV PEAK A VEL: 0.49 M/S
MV PEAK E VEL: 54 CM/S
MV STENOSIS PRESSURE HALF TIME: 58 MS
MV VALVE AREA P 1/2 METHOD: 3.79
NONHDLC SERPL-MCNC: 76 MG/DL
PHOSPHATE SERPL-MCNC: 2.7 MG/DL (ref 2.7–4.5)
PHOSPHATE SERPL-MCNC: 2.8 MG/DL (ref 2.7–4.5)
PLATELET # BLD AUTO: 210 THOUSANDS/UL (ref 149–390)
PMV BLD AUTO: 13.3 FL (ref 8.9–12.7)
POTASSIUM SERPL-SCNC: 3.6 MMOL/L (ref 3.5–5.3)
POTASSIUM SERPL-SCNC: 3.7 MMOL/L (ref 3.5–5.3)
PROT SERPL-MCNC: 5.5 G/DL (ref 6.4–8.4)
RBC # BLD AUTO: 3.99 MILLION/UL (ref 3.88–5.62)
RIGHT ATRIUM AREA SYSTOLE A4C: 15.5 CM2
RIGHT VENTRICLE ID DIMENSION: 3.3 CM
SL CV LEFT ATRIUM LENGTH A2C: 5.1 CM
SL CV LV EF: 62
SL CV PED ECHO LEFT VENTRICLE DIASTOLIC VOLUME (MOD BIPLANE) 2D: 99 ML
SL CV PED ECHO LEFT VENTRICLE SYSTOLIC VOLUME (MOD BIPLANE) 2D: 44 ML
SODIUM SERPL-SCNC: 136 MMOL/L (ref 135–147)
SODIUM SERPL-SCNC: 137 MMOL/L (ref 135–147)
TRICUSPID ANNULAR PLANE SYSTOLIC EXCURSION: 2.4 CM
TRIGL SERPL-MCNC: 89 MG/DL
TSH SERPL DL<=0.05 MIU/L-ACNC: 1.48 UIU/ML (ref 0.45–4.5)
WBC # BLD AUTO: 10.25 THOUSAND/UL (ref 4.31–10.16)

## 2023-12-31 PROCEDURE — 83735 ASSAY OF MAGNESIUM: CPT | Performed by: STUDENT IN AN ORGANIZED HEALTH CARE EDUCATION/TRAINING PROGRAM

## 2023-12-31 PROCEDURE — 99255 IP/OBS CONSLTJ NEW/EST HI 80: CPT | Performed by: STUDENT IN AN ORGANIZED HEALTH CARE EDUCATION/TRAINING PROGRAM

## 2023-12-31 PROCEDURE — 80053 COMPREHEN METABOLIC PANEL: CPT | Performed by: INTERNAL MEDICINE

## 2023-12-31 PROCEDURE — 84100 ASSAY OF PHOSPHORUS: CPT | Performed by: STUDENT IN AN ORGANIZED HEALTH CARE EDUCATION/TRAINING PROGRAM

## 2023-12-31 PROCEDURE — 82948 REAGENT STRIP/BLOOD GLUCOSE: CPT

## 2023-12-31 PROCEDURE — 84484 ASSAY OF TROPONIN QUANT: CPT

## 2023-12-31 PROCEDURE — 84100 ASSAY OF PHOSPHORUS: CPT | Performed by: INTERNAL MEDICINE

## 2023-12-31 PROCEDURE — 93306 TTE W/DOPPLER COMPLETE: CPT | Performed by: INTERNAL MEDICINE

## 2023-12-31 PROCEDURE — 85027 COMPLETE CBC AUTOMATED: CPT | Performed by: STUDENT IN AN ORGANIZED HEALTH CARE EDUCATION/TRAINING PROGRAM

## 2023-12-31 PROCEDURE — 99233 SBSQ HOSP IP/OBS HIGH 50: CPT | Performed by: INTERNAL MEDICINE

## 2023-12-31 PROCEDURE — NC001 PR NO CHARGE: Performed by: INTERNAL MEDICINE

## 2023-12-31 PROCEDURE — 83735 ASSAY OF MAGNESIUM: CPT | Performed by: INTERNAL MEDICINE

## 2023-12-31 PROCEDURE — 80061 LIPID PANEL: CPT | Performed by: STUDENT IN AN ORGANIZED HEALTH CARE EDUCATION/TRAINING PROGRAM

## 2023-12-31 PROCEDURE — 93306 TTE W/DOPPLER COMPLETE: CPT

## 2023-12-31 PROCEDURE — 84443 ASSAY THYROID STIM HORMONE: CPT | Performed by: STUDENT IN AN ORGANIZED HEALTH CARE EDUCATION/TRAINING PROGRAM

## 2023-12-31 PROCEDURE — 93005 ELECTROCARDIOGRAM TRACING: CPT

## 2023-12-31 PROCEDURE — 80048 BASIC METABOLIC PNL TOTAL CA: CPT | Performed by: STUDENT IN AN ORGANIZED HEALTH CARE EDUCATION/TRAINING PROGRAM

## 2023-12-31 RX ORDER — INSULIN LISPRO 100 [IU]/ML
1-5 INJECTION, SOLUTION INTRAVENOUS; SUBCUTANEOUS
Status: DISCONTINUED | OUTPATIENT
Start: 2023-12-31 | End: 2024-01-02 | Stop reason: HOSPADM

## 2023-12-31 RX ORDER — GUAIFENESIN 600 MG/1
1200 TABLET, EXTENDED RELEASE ORAL EVERY 12 HOURS SCHEDULED
Status: DISCONTINUED | OUTPATIENT
Start: 2023-12-31 | End: 2024-01-02 | Stop reason: HOSPADM

## 2023-12-31 RX ORDER — INSULIN LISPRO 100 [IU]/ML
10 INJECTION, SOLUTION INTRAVENOUS; SUBCUTANEOUS
Status: DISCONTINUED | OUTPATIENT
Start: 2023-12-31 | End: 2024-01-02 | Stop reason: HOSPADM

## 2023-12-31 RX ORDER — POTASSIUM CHLORIDE 20 MEQ/1
40 TABLET, EXTENDED RELEASE ORAL ONCE
Status: COMPLETED | OUTPATIENT
Start: 2023-12-31 | End: 2023-12-31

## 2023-12-31 RX ORDER — INSULIN LISPRO 100 [IU]/ML
1-6 INJECTION, SOLUTION INTRAVENOUS; SUBCUTANEOUS
Status: DISCONTINUED | OUTPATIENT
Start: 2023-12-31 | End: 2024-01-02 | Stop reason: HOSPADM

## 2023-12-31 RX ORDER — INSULIN GLARGINE 100 [IU]/ML
30 INJECTION, SOLUTION SUBCUTANEOUS EVERY 24 HOURS
Status: DISCONTINUED | OUTPATIENT
Start: 2023-12-31 | End: 2024-01-01

## 2023-12-31 RX ORDER — LOSARTAN POTASSIUM 50 MG/1
50 TABLET ORAL DAILY
Status: DISCONTINUED | OUTPATIENT
Start: 2024-01-01 | End: 2024-01-02 | Stop reason: HOSPADM

## 2023-12-31 RX ADMIN — GUAIFENESIN 1200 MG: 600 TABLET, EXTENDED RELEASE ORAL at 01:42

## 2023-12-31 RX ADMIN — DEXTROSE, SODIUM CHLORIDE, SODIUM LACTATE, POTASSIUM CHLORIDE, AND CALCIUM CHLORIDE 100 ML/HR: 5; .6; .31; .03; .02 INJECTION, SOLUTION INTRAVENOUS at 01:40

## 2023-12-31 RX ADMIN — POTASSIUM CHLORIDE 40 MEQ: 1500 TABLET, EXTENDED RELEASE ORAL at 06:51

## 2023-12-31 RX ADMIN — INSULIN LISPRO 2 UNITS: 100 INJECTION, SOLUTION INTRAVENOUS; SUBCUTANEOUS at 17:10

## 2023-12-31 RX ADMIN — INSULIN LISPRO 10 UNITS: 100 INJECTION, SOLUTION INTRAVENOUS; SUBCUTANEOUS at 17:11

## 2023-12-31 RX ADMIN — GUAIFENESIN 1200 MG: 600 TABLET, EXTENDED RELEASE ORAL at 08:37

## 2023-12-31 RX ADMIN — INSULIN LISPRO 2 UNITS: 100 INJECTION, SOLUTION INTRAVENOUS; SUBCUTANEOUS at 21:37

## 2023-12-31 RX ADMIN — CHLORHEXIDINE GLUCONATE 15 ML: 1.2 SOLUTION ORAL at 08:36

## 2023-12-31 RX ADMIN — INSULIN LISPRO 10 UNITS: 100 INJECTION, SOLUTION INTRAVENOUS; SUBCUTANEOUS at 12:25

## 2023-12-31 RX ADMIN — ENOXAPARIN SODIUM 40 MG: 40 INJECTION SUBCUTANEOUS at 08:36

## 2023-12-31 NOTE — ASSESSMENT & PLAN NOTE
Anion gap originally 23, CO2 19. Most recent BMP with AG 8 and CO2 25. S/P 3 L Isolyte in the ED plus IVF per DKA protocol. Anion gap resolved on Insulin gtt.    Plan:  Continue to monitor

## 2023-12-31 NOTE — CONSULTS
Endocrinology Consultation  Benitez White 38 y.o. male MRN: [id number]  Unit/Bed#: MICU 04 Encounter: [id number]      Assessment/Plan     Assessment:  This is a 38 y.o.-year-old male with newly diagnosed diabetes, presenting with DKA.  Placed on insulin infusion, gap quickly closed, he has utilized 190 units of insulin in 22 hours.    Plan:  --Transition off of insulin infusion today  --Will give weight-based 30 units of Lantus now, and stop insulin infusion 2 hours after  --Start 10 units of lispro 3 times daily before every meal  --Start Algorithm 3/2 correction scale  --Dietitian evaluation  --Will need insulin and glucometer training  --Anticipate discharge on insulin  --Hypoglycemia protocol  --Will continue to follow and make adjustments as appropriate  -- Provided contact for endocrine fellow clinic, would recommend follow-up in 2 to 4 weeks  -- Will need antibody evaluation for type 1 diabetes, will place available inpatient orders now    CC: Diabetes Consult    History of Present Illness     HPI: Benitez White is a 38 y.o. year old male with history of obesity, minimal medical follow-up.  He has been feeling poorly for the last week, unable to tolerate food, frequent urination, urinary incontinence, significant fatigue, generalized weakness.  Has only been able to eat fruit and yogurt due to taste.    In ED, found hyperglycemic at 579, beta hydroxybutyrate positive at 5.8, anion gap elevated at 23.  He was also found RSV positive.  Admitted with DKA, found with A1c 14.4.  Denies any family history of diabetes.  Has lost a few pounds this week due to decreased intake.  Reports that 1 week ago he felt perfectly fine.  No routine lab work for a number of years.    Inpatient consult to Endocrinology  Consult performed by: Letha Colvin DO  Consult ordered by: Luis Sanchez MD          Review of Systems   Constitutional:  Positive for activity change, appetite change and unexpected weight change.    Gastrointestinal:  Positive for abdominal pain. Negative for constipation, diarrhea and vomiting.   Endocrine: Positive for polydipsia and polyuria.   Genitourinary:  Positive for frequency and urgency. Negative for decreased urine volume, difficulty urinating and dysuria.       Historical Information   History reviewed. No pertinent past medical history.  History reviewed. No pertinent surgical history.  Social History   Social History     Substance and Sexual Activity   Alcohol Use Not Currently     Social History     Substance and Sexual Activity   Drug Use Not Currently    Types: Marijuana     Social History     Tobacco Use   Smoking Status Every Day    Types: Cigarettes   Smokeless Tobacco Current     Family History: History reviewed. No pertinent family history.    Meds/Allergies   Current Facility-Administered Medications   Medication Dose Route Frequency Provider Last Rate Last Admin    acetaminophen (TYLENOL) tablet 650 mg  650 mg Oral Q6H PRN Flavio Aguilar DO        chlorhexidine (PERIDEX) 0.12 % oral rinse 15 mL  15 mL Mouth/Throat Q12H [facility] Flavio Aguilar DO   15 mL at 12/31/23 0836    enoxaparin (LOVENOX) subcutaneous injection 40 mg  40 mg Subcutaneous Daily Flavio Aguilar DO   40 mg at 12/31/23 0836    guaiFENesin (MUCINEX) 12 hr tablet 1,200 mg  1,200 mg Oral Q12H [facility] Kamala Malcolm MD   1,200 mg at 12/31/23 0837    insulin glargine (LANTUS) subcutaneous injection 30 Units 0.3 mL  30 Units Subcutaneous Q24H Letha Colvin DO        insulin lispro (HumaLOG) 100 units/mL subcutaneous injection 1-5 Units  1-5 Units Subcutaneous HS Letha Colvin DO        insulin lispro (HumaLOG) 100 units/mL subcutaneous injection 1-6 Units  1-6 Units Subcutaneous TID AC Letha Colvin DO        insulin lispro (HumaLOG) 100 units/mL subcutaneous injection 10 Units  10 Units Subcutaneous TID With Meals Letha Colvin DO   10 Units at 12/31/23 1225    insulin regular (HumuLIN R,NovoLIN R) 1 Units/mL  "in sodium chloride 0.9 % 100 mL infusion  0.3-21 Units/hr Intravenous Titrated Letha Colvin,    Stopped at 12/31/23 1216     Allergies   Allergen Reactions    Nuts - Food Allergy      Wilder       Objective   Vitals: Blood pressure 141/78, pulse 58, temperature 98.5 °F (36.9 °C), resp. rate 21, height 5' 8\" (1.727 m), weight 104 kg (230 lb), SpO2 97%.    Intake/Output Summary (Last 24 hours) at 12/31/2023 1232  Last data filed at 12/31/2023 1000  Gross per 24 hour   Intake 6716.7 ml   Output 500 ml   Net 6216.7 ml     Invasive Devices       Peripheral Intravenous Line  Duration             Peripheral IV 12/30/23 Distal;Right;Upper;Ventral (anterior) Arm 1 day    Peripheral IV 12/30/23 Left Antecubital <1 day                    Physical Exam  Constitutional:       General: He is not in acute distress.     Appearance: Normal appearance. He is obese. He is not ill-appearing, toxic-appearing or diaphoretic.      Comments: [employer] bag with everything bagel at bedside   HENT:      Head: Normocephalic and atraumatic.      Nose: Nose normal.   Eyes:      Extraocular Movements: Extraocular movements intact.      Conjunctiva/sclera: Conjunctivae normal.      Pupils: Pupils are equal, round, and reactive to light.   Cardiovascular:      Rate and Rhythm: Normal rate and regular rhythm.   Pulmonary:      Effort: Pulmonary effort is normal. No respiratory distress.   Abdominal:      General: There is no distension.   Musculoskeletal:         General: No deformity.      Right lower leg: No edema.      Left lower leg: No edema.   Skin:     General: Skin is warm and dry.   Neurological:      General: No focal deficit present.      Mental Status: He is alert. Mental status is at baseline.   Psychiatric:         Mood and Affect: Mood normal.         Behavior: Behavior normal.         Thought Content: Thought content normal.         The history was obtained from the review of the chart, patient and family.    Lab Results: " "  Results from last 7 days   Lab Units 12/30/23  1433   HEMOGLOBIN A1C % 14.4*     Lab Results   Component Value Date    WBC 10.25 (H) 12/31/2023    HGB 11.5 (L) 12/31/2023    HCT 34.8 (L) 12/31/2023    MCV 87 12/31/2023     12/31/2023     Lab Results   Component Value Date/Time    BUN 13 12/31/2023 04:37 AM    K 3.6 12/31/2023 04:37 AM     12/31/2023 04:37 AM    CO2 26 12/31/2023 04:37 AM    CREATININE 0.72 12/31/2023 04:37 AM    AST 19 12/31/2023 04:37 AM    ALT 20 12/31/2023 04:37 AM    TP 5.5 (L) 12/31/2023 04:37 AM    ALB 3.2 (L) 12/31/2023 04:37 AM     Recent Labs     12/31/23  0437   HDL 26*   TRIG 89     No results found for: \"MICROALBUR\", \"[id number]\"  POC Glucose (mg/dl)   Date Value   12/31/2023 138   12/31/2023 202 (H)   12/31/2023 190 (H)   12/31/2023 101   12/31/2023 127   12/31/2023 137   12/31/2023 227 (H)   12/30/2023 174 (H)   12/30/2023 183 (H)   12/30/2023 159 (H)       Imaging Studies: I have personally reviewed pertinent reports.      Letha Colvin  Endocrinology PGY-4    Please [employer] questions to the physician covering the \"[id number]\" Role. Thank you.   "

## 2023-12-31 NOTE — ASSESSMENT & PLAN NOTE
"Patient with elevated BP throughout admission with SBP up to 180. Patient reports history of hypertension in the past and was previously on antihypertensives however he self-discontinued. He is hesitant to starting antihypertensive therapy due to side effects and reports \"I'll probably stop taking it.\" He is agreeable at this time to starting losartan 50 mg daily.    Plan:  Continue Losartan 50mg daily  "

## 2023-12-31 NOTE — PROGRESS NOTES
[facility]  Progress Note: Critical Care  Name: Benitez White 38 y.o. male I MRN: [id number]  Unit/Bed#: MICU 04 I Date of Admission: 12/30/2023   Date of Service: 12/31/2023 I Hospital Day: 1    Assessment/Plan   Neuro:   No active issues    CV:   Abnormal EKG   Nonspecific ST abnormalities with T wave inversions in lead II, III, aVF, V3, V4, V5, V6, new when compared to prior. No reciprocal changes. Patient is asymptomatic. Electrolytes WNL  Repeat EKG pending.    Pulm:  RSV infection   Mostly asymptomatic except for reported chest congestion  Mucinex ordered    GI:   No active issues    :   Anion gap metabolic acidosis - Resolved  Secondary to DKA.  S/P 3 L Isolyte in the ED plus IVF per DKA protocol.   I/O +5.1 L since admission.  Anion gap resolved on Insulin gtt.    F/E/N:   F: D5 LR at 100 mL/hr. Can discontinue if patient is tolerating diet.  E: Monitor electrolytes and replete as needed. Goal K> 4, MG >2. Will switch labs to daily.  N: Diabetic diet algorithm 2.     Heme/Onc:   DVT prophylaxis with Lovenox.      Endo:   DKA - resolved.  Beta-Hydroxybutyrate elevated on admission at 5.8  Anion gap originally 23, CO2 19. Most recent BMP with AG 8 and CO2 25.  Resolved with DKA protocol    New diagnosis of diabetes  A1C 14.4  Endocrine consulted.  Continue Insulin drip for now.   Montior BG level.    ID:   RSV infection  Supportive care.    MSK/Skin:   OOB and ambulate as tolerated.      Disposition: Med Surg    ICU Core Measures     A: Assess, Prevent, and Manage Pain Has pain been assessed? Yes  Need for changes to pain regimen? No   B: Both SAT/SAT  N/A   C: Choice of Sedation RASS Goal: 0 Alert and Calm or N/A patient not on sedation  Need for changes to sedation or analgesia regimen? NA   D: Delirium CAM-ICU: Negative   E: Early Mobility  Plan for early mobility? Yes   F: Family Engagement Plan for family engagement today? Yes         Prophylaxis:  VTE VTE covered  by:  enoxaparin, Subcutaneous       Stress Ulcer  not ordered        Significant 24hr Events     24hr events: Patient started on DKA protocol with improvement in metabolic acidosis. No acute events overnight.     Subjective     Review of Systems   Constitutional:  Negative for appetite change, chills, fatigue and fever.   HENT: Negative.     Respiratory:  Positive for cough. Negative for shortness of breath and wheezing.    Cardiovascular:  Negative for chest pain, palpitations and leg swelling.   Skin: Negative.    Neurological:  Negative for dizziness and light-headedness.   Hematological: Negative.    Psychiatric/Behavioral: Negative.            Objective                            Vitals I/O      Most Recent Min/Max in 24hrs   Temp 98 °F (36.7 °C) Temp  Min: 97.6 °F (36.4 °C)  Max: 98 °F (36.7 °C)   Pulse 65 Pulse  Min: 59  Max: 99   Resp 12 Resp  Min: 12  Max: 26   /85 BP  Min: 144/85  Max: 180/89   O2 Sat 96 % SpO2  Min: 96 %  Max: 100 %      Intake/Output Summary (Last 24 hours) at 12/31/2023 0105  Last data filed at 12/30/2023 2306  Gross per 24 hour   Intake 5631.06 ml   Output 500 ml   Net 5131.06 ml       Diet Duglas/CHO Controlled; Consistent Carbohydrate Diet Level 2 (5 carb servings/75 grams CHO/meal)    Invasive Monitoring   N/A        Physical Exam   Physical Exam  Vitals and nursing note reviewed.   Eyes:      Pupils: Pupils are equal, round, and reactive to light.   Skin:     General: Skin is warm.   HENT:      Head: Normocephalic.   Cardiovascular:      Rate and Rhythm: Normal rate.      Heart sounds: No murmur heard.     No friction rub. No gallop.   Musculoskeletal:      Right lower leg: No edema.      Left lower leg: No edema.   Abdominal: General: Bowel sounds are normal.      Palpations: Abdomen is soft.   Constitutional:       General: He is not in acute distress.     Appearance: He is well-developed. He is not ill-appearing or toxic-appearing.   Pulmonary:      Effort: Pulmonary effort  is normal. No respiratory distress.      Breath sounds: Normal breath sounds.   Neurological:      General: No focal deficit present.      Mental Status: He is alert and oriented to person, place and time. Mental status is at baseline.      Motor: Strength full and intact in all extremities.            Diagnostic Studies      EKG: Nonspecific ST-T abnormalities with T wave inversions in leads II, V3, V5.  Imaging: No imaging to review.     Medications:  Scheduled PRN   chlorhexidine, 15 mL, Q12H [place]  enoxaparin, 40 mg, Daily  guaiFENesin, 1,200 mg, Q12H [place]      acetaminophen, 650 mg, Q6H PRN       Continuous    dextrose 5% lactated ringer's, 250 mL/hr, Last Rate: 250 mL/hr (12/30/23 1756)  insulin regular (HumuLIN R,NovoLIN R) 1 Units/mL in sodium chloride 0.9 % 100 mL infusion, 0.3-21 Units/hr, Last Rate: 14 Units/hr (12/31/23 0047)         Labs:    CBC    Recent Labs     12/30/23  1137   WBC 11.39*   HGB 14.5   HCT 44.6     BMP    Recent Labs     12/30/23  1644 12/30/23  2049   SODIUM 135 137   K 3.7 3.5    104   CO2 19* 26   AGAP 15 7   BUN 18 15   CREATININE 0.85 0.77   CALCIUM 8.4 7.9*       Coags    No recent results     Additional Electrolytes  Recent Labs     12/30/23  1644 12/30/23  2049   MG 1.9 1.9   PHOS 3.6 2.7          Blood Gas    No recent results  Recent Labs     12/30/23  1148   PHVEN 7.348   PRT9XCF 33.7*   PO2VEN 67.4*   DMU0JEY 18.1*   BEVEN -6.4   A4RGVOQ 89.3*    LFTs  Recent Labs     12/30/23  1148   ALT 28   AST 16   ALKPHOS 145*   ALB 4.4   TBILI 1.02*       Infectious  No recent results  Glucose  Recent Labs     12/30/23  1148 12/30/23  1644 12/30/23  2049   GLUC 579* 282* 164*               Kamala Malcolm MD  Pulmonary and Critical Care Fellow, PGY-5  [facility]

## 2023-12-31 NOTE — ASSESSMENT & PLAN NOTE
Lab Results   Component Value Date    HGBA1C 14.4 (H) 12/30/2023       Recent Labs     01/01/24  1629 01/01/24  2107 01/02/24  0732 01/02/24  1032   POCGLU 166* 190* 186* 289*       Blood Sugar Average: Last 72 hrs:  (P) 217.36    38-year-old male with new onset DM presented in DKA. Anion gap originally 23, CO2 19. Most recent BMP with AG 8 and CO2 25.  Gap closed while on DKA protocol.  Endocrinology has seen patient, appreciate recs outlined below.    Plan:  -- Switch to NPH 70/30 30 units twice daily with meals given lack of insurance  --Glucometer with supplies sent to pharmacy  --Dietitian evaluation  -- Provided contact for endocrine fellow clinic, would recommend follow-up in 2 to 4 weeks  -- Antibody evaluation for type 1 diabetes pending  --Follow-up with PCP clinic within 1 to 2 weeks  -- Statin indicated, however, will hold for now PCP address it. Patient feels overwhelmed given new diagnosis.

## 2023-12-31 NOTE — PROGRESS NOTES
Critical Care Interval Transfer Note:    Please refer to progress note from earlier today for full details.     Barriers to discharge:   Insulin regimen   Endocrine recs   TTE   Wean insulin GTT     Consults: IP CONSULT TO ENDOCRINOLOGY  IP CONSULT TO CASE MANAGEMENT    Recommended to review admission imaging for incidental findings and document in discharge navigator: Incidental findings have been documented in discharge navigator. Patient and/or family was informed and they expressed understanding.      Discharge Plan: Anticipate discharge in 24-48 hrs to home.            Patient seen and evaluated by Critical Care today and deemed to be appropriate for transfer to Med Surg with Telemetry. Spoke to Dr Leon from [place] to accept transfer under Dr Hutchins. Critical care can be contacted via [employer] with any questions or concerns.

## 2023-12-31 NOTE — PROGRESS NOTES
ICU Transfer NOTE     Name: Benitez White   Age & Sex: 38 y.o. male   MRN: [id number]  Unit/Bed#: MICU 04   Encounter: [id number]  Team: SOD Team B     PATIENT INFORMATION     Name: Benitez White   Age & Sex: 38 y.o. male   MRN: [id number]  Hospital Stay Days: 1    ASSESSMENT/PLAN     Principal Problem:    DKA (diabetic ketoacidosis) (HCC)  Active Problems:    Increased anion gap metabolic acidosis    RSV (respiratory syncytial virus infection)      RSV (respiratory syncytial virus infection)  Assessment & Plan  Continue Mucinex    Increased anion gap metabolic acidosis  Assessment & Plan  Anion gap originally 23, CO2 19. Most recent BMP with AG 8 and CO2 25. S/P 3 L Isolyte in the ED plus IVF per DKA protocol. Anion gap resolved on Insulin gtt.    Plan:  Continue to monitor    * DKA (diabetic ketoacidosis) (HCC)  Assessment & Plan  Lab Results   Component Value Date    HGBA1C 14.4 (H) 12/30/2023       Recent Labs     12/31/23  0633 12/31/23  0840 12/31/23  1000 12/31/23  1158   POCGLU 101 190* 202* 138       Blood Sugar Average: Last 72 hrs:  (P) [id number]    38-year-old male with new onset DM presented in DKA. Anion gap originally 23, CO2 19. Most recent BMP with AG 8 and CO2 25.  Gap closed while on DKA protocol.  Endocrinology has seen patient, appreciate recs outlined below.    Plan:  --Transition off of insulin infusion today  --Will give weight-based 30 units of Lantus now, and stop insulin infusion 2 hours after  --Start 10 units of lispro 3 times daily before every meal  --Start Algorithm 3/2 correction scale  --Dietitian evaluation  --Will need insulin and glucometer training  --Anticipate discharge on insulin  --Hypoglycemia protocol  -- Provided contact for endocrine fellow clinic, would recommend follow-up in 2 to 4 weeks  -- Antibody evaluation for type 1 diabetes,            Disposition: pending medical stabilization     Hospital Course     38-year-old male without significant past  "medical history who presented with 1 week of worsening general weakness and urinary frequency with increased thirst and decreased appetite.  Patient states that he has been urinating about every hour.  No family history of DM.    Patient diagnosed with DKA and treated in the ICU.  Original fully beta hydroxybutyrate was elevated at 5.8, anion gap was 23 with CO2 of 19.  Resolved with IV fluids and insulin gtt per DKA protocol.  Patient was evaluated by endocrinology who recommended transition from insulin drip to subcutaneous insulin and sliding scale.  Patient's electrolytes were monitored and repleted as necessary.  Of note, patient also had complaint of cold-like symptoms and diagnosed with RSV.  He is mostly asymptomatic except for reported chest congestion.  Mucinex was ordered.    On my evaluation, patient was hemodynamically stable and afebrile.  Electrolytes were within normal limits.      Subjective  Pt states that he feels \"fine.\" Denies fever/chills, SOB, CP, diarrhea, constipation.     OBJECTIVE     Vitals:    23 0600 23 0800 23 1127 23 1225   BP:  141/78 141/78 (!) 172/90   Pulse: 58 62 58 91   Resp:  20   Temp:  98.5 °F (36.9 °C)     TempSrc:  Axillary     SpO2: 97% 97%  95%   Weight: 104 kg (230 lb 6.1 oz)  104 kg (230 lb)    Height:   5' 8\" (1.727 m)       Temperature:   Temp (24hrs), Av.3 °F (36.8 °C), Min:98 °F (36.7 °C), Max:98.5 °F (36.9 °C)    Temperature: 98.5 °F (36.9 °C)  Intake & Output:  I/O          0701   07 07 07 07 0700    P.O.  640 440    I.V. (mL/kg)  5876.7 (56.5) 280.5 (2.7)    Total Intake(mL/kg)  6516.7 (62.7) 720.5 (6.9)    Urine (mL/kg/hr)  500     Stool  0     Total Output  500     Net  +6016.7 +720.5           Unmeasured Urine Occurrence  4 x 3 x    Unmeasured Stool Occurrence  1 x           Weights:   IBW (Ideal Body Weight): 68.4 kg    Body mass index is 34.97 kg/m².  Weight (last 2 days)       " Date/Time Weight    12/31/23 1127 104 (230)    12/31/23 0600 104 (230.38)    12/30/23 1446 104 (228.84)          Physical Exam  Vitals reviewed.   Constitutional:       Appearance: Normal appearance.   HENT:      Nose: Nose normal.      Mouth/Throat:      Mouth: Mucous membranes are moist.      Pharynx: Oropharynx is clear.   Eyes:      Extraocular Movements: Extraocular movements intact.      Conjunctiva/sclera: Conjunctivae normal.      Pupils: Pupils are equal, round, and reactive to light.   Cardiovascular:      Rate and Rhythm: Normal rate and regular rhythm.      Pulses: Normal pulses.      Heart sounds: Normal heart sounds.   Pulmonary:      Effort: Pulmonary effort is normal.      Breath sounds: Normal breath sounds.   Abdominal:      General: Abdomen is flat. Bowel sounds are normal.      Palpations: Abdomen is soft.   Skin:     General: Skin is warm and dry.      Capillary Refill: Capillary refill takes less than 2 seconds.   Neurological:      General: No focal deficit present.      Mental Status: He is alert and oriented to person, place, and time. Mental status is at baseline.       LABORATORY DATA     Labs: I have personally reviewed pertinent reports.  Results from last 7 days   Lab Units 12/31/23  0437 12/30/23  1137   WBC Thousand/uL 10.25* 11.39*   HEMOGLOBIN g/dL 11.5* 14.5   HEMATOCRIT % 34.8* 44.6   PLATELETS Thousands/uL 210  --    NEUTROS PCT %  --  76*   MONOS PCT %  --  5   EOS PCT %  --  0      Results from last 7 days   Lab Units 12/31/23  0437 12/31/23  0044 12/30/23  2049 12/30/23  1644 12/30/23  1148   POTASSIUM mmol/L 3.6 3.7 3.5   < > 4.8   CHLORIDE mmol/L 104 103 104   < > 92*   CO2 mmol/L 26 25 26   < > 19*   BUN mg/dL 13 13 15   < > 22   CREATININE mg/dL 0.72 0.77 0.77   < > 1.16   CALCIUM mg/dL 8.5 8.3* 7.9*   < > 10.2   ALK PHOS U/L 85  --   --   --  145*   ALT U/L 20  --   --   --  28   AST U/L 19  --   --   --  16    < > = values in this interval not displayed.     Results from  "last 7 days   Lab Units 12/31/23  0437 12/31/23  0044 12/30/23  2049   MAGNESIUM mg/dL 1.9 1.8* 1.9     Results from last 7 days   Lab Units 12/31/23  0437 12/31/23  0044 12/30/23  2049   PHOSPHORUS mg/dL 2.7 2.8 2.7                    IMAGING & DIAGNOSTIC TESTING     Radiology Results: I have personally reviewed pertinent reports.  No results found.  Other Diagnostic Testing: I have personally reviewed pertinent reports.    ACTIVE MEDICATIONS     Current Facility-Administered Medications   Medication Dose Route Frequency    acetaminophen (TYLENOL) tablet 650 mg  650 mg Oral Q6H PRN    chlorhexidine (PERIDEX) 0.12 % oral rinse 15 mL  15 mL Mouth/Throat Q12H [place]    enoxaparin (LOVENOX) subcutaneous injection 40 mg  40 mg Subcutaneous Daily    guaiFENesin (MUCINEX) 12 hr tablet 1,200 mg  1,200 mg Oral Q12H [place]    insulin glargine (LANTUS) subcutaneous injection 30 Units 0.3 mL  30 Units Subcutaneous Q24H    insulin lispro (HumaLOG) 100 units/mL subcutaneous injection 1-5 Units  1-5 Units Subcutaneous HS    insulin lispro (HumaLOG) 100 units/mL subcutaneous injection 1-6 Units  1-6 Units Subcutaneous TID AC    insulin lispro (HumaLOG) 100 units/mL subcutaneous injection 10 Units  10 Units Subcutaneous TID With Meals       VTE Pharmacologic Prophylaxis: Enoxaparin (Lovenox)  VTE Mechanical Prophylaxis: sequential compression device    Portions of the record may have been created with voice recognition software.  Occasional wrong word or \"sound a like\" substitutions may have occurred due to the inherent limitations of voice recognition software.  Read the chart carefully and recognize, using context, where substitutions have occurred.  ==  Alber Ndiaye MD  [facility]  Internal Medicine Residency PGY-1      "

## 2024-01-01 PROBLEM — E87.29 INCREASED ANION GAP METABOLIC ACIDOSIS: Status: RESOLVED | Noted: 2023-12-31 | Resolved: 2024-01-01

## 2024-01-01 LAB
ALBUMIN SERPL BCP-MCNC: 3.5 G/DL (ref 3.5–5)
ALP SERPL-CCNC: 91 U/L (ref 34–104)
ALT SERPL W P-5'-P-CCNC: 22 U/L (ref 7–52)
ANION GAP SERPL CALCULATED.3IONS-SCNC: 8 MMOL/L
AST SERPL W P-5'-P-CCNC: 19 U/L (ref 13–39)
ATRIAL RATE: 61 BPM
ATRIAL RATE: 71 BPM
BILIRUB SERPL-MCNC: 0.92 MG/DL (ref 0.2–1)
BUN SERPL-MCNC: 11 MG/DL (ref 5–25)
CALCIUM SERPL-MCNC: 8.7 MG/DL (ref 8.4–10.2)
CHLORIDE SERPL-SCNC: 98 MMOL/L (ref 96–108)
CO2 SERPL-SCNC: 27 MMOL/L (ref 21–32)
CREAT SERPL-MCNC: 0.73 MG/DL (ref 0.6–1.3)
ERYTHROCYTE [DISTWIDTH] IN BLOOD BY AUTOMATED COUNT: 13.7 % (ref 11.6–15.1)
GFR SERPL CREATININE-BSD FRML MDRD: 116 ML/MIN/1.73SQ M
GLUCOSE SERPL-MCNC: 166 MG/DL (ref 65–140)
GLUCOSE SERPL-MCNC: 190 MG/DL (ref 65–140)
GLUCOSE SERPL-MCNC: 295 MG/DL (ref 65–140)
GLUCOSE SERPL-MCNC: 312 MG/DL (ref 65–140)
GLUCOSE SERPL-MCNC: 320 MG/DL (ref 65–140)
HCT VFR BLD AUTO: 37.9 % (ref 36.5–49.3)
HGB BLD-MCNC: 12.8 G/DL (ref 12–17)
MAGNESIUM SERPL-MCNC: 1.8 MG/DL (ref 1.9–2.7)
MCH RBC QN AUTO: 28.5 PG (ref 26.8–34.3)
MCHC RBC AUTO-ENTMCNC: 33.8 G/DL (ref 31.4–37.4)
MCV RBC AUTO: 84 FL (ref 82–98)
P AXIS: 33 DEGREES
P AXIS: 45 DEGREES
PLATELET # BLD AUTO: 212 THOUSANDS/UL (ref 149–390)
PMV BLD AUTO: 13.2 FL (ref 8.9–12.7)
POTASSIUM SERPL-SCNC: 3.3 MMOL/L (ref 3.5–5.3)
PR INTERVAL: 138 MS
PR INTERVAL: 144 MS
PROT SERPL-MCNC: 6 G/DL (ref 6.4–8.4)
QRS AXIS: -5 DEGREES
QRS AXIS: 12 DEGREES
QRSD INTERVAL: 102 MS
QRSD INTERVAL: 92 MS
QT INTERVAL: 418 MS
QT INTERVAL: 444 MS
QTC INTERVAL: 446 MS
QTC INTERVAL: 454 MS
RBC # BLD AUTO: 4.49 MILLION/UL (ref 3.88–5.62)
SODIUM SERPL-SCNC: 133 MMOL/L (ref 135–147)
T WAVE AXIS: 152 DEGREES
T WAVE AXIS: 264 DEGREES
VENTRICULAR RATE: 61 BPM
VENTRICULAR RATE: 71 BPM
WBC # BLD AUTO: 8.38 THOUSAND/UL (ref 4.31–10.16)

## 2024-01-01 PROCEDURE — 80053 COMPREHEN METABOLIC PANEL: CPT | Performed by: INTERNAL MEDICINE

## 2024-01-01 PROCEDURE — 83519 RIA NONANTIBODY: CPT | Performed by: INTERNAL MEDICINE

## 2024-01-01 PROCEDURE — 86337 INSULIN ANTIBODIES: CPT | Performed by: INTERNAL MEDICINE

## 2024-01-01 PROCEDURE — 86341 ISLET CELL ANTIBODY: CPT | Performed by: INTERNAL MEDICINE

## 2024-01-01 PROCEDURE — 83735 ASSAY OF MAGNESIUM: CPT | Performed by: INTERNAL MEDICINE

## 2024-01-01 PROCEDURE — 85027 COMPLETE CBC AUTOMATED: CPT | Performed by: INTERNAL MEDICINE

## 2024-01-01 PROCEDURE — 82948 REAGENT STRIP/BLOOD GLUCOSE: CPT

## 2024-01-01 RX ORDER — INSULIN GLARGINE 100 [IU]/ML
30 INJECTION, SOLUTION SUBCUTANEOUS
Status: DISCONTINUED | OUTPATIENT
Start: 2024-01-01 | End: 2024-01-02 | Stop reason: HOSPADM

## 2024-01-01 RX ORDER — INSULIN LISPRO 100 [IU]/ML
10 INJECTION, SOLUTION INTRAVENOUS; SUBCUTANEOUS
Qty: 9 ML | Refills: 0 | Status: CANCELLED | OUTPATIENT
Start: 2024-01-01 | End: 2024-01-31

## 2024-01-01 RX ORDER — POTASSIUM CHLORIDE 20 MEQ/1
60 TABLET, EXTENDED RELEASE ORAL ONCE
Status: COMPLETED | OUTPATIENT
Start: 2024-01-01 | End: 2024-01-01

## 2024-01-01 RX ORDER — INSULIN GLARGINE 100 [IU]/ML
30 INJECTION, SOLUTION SUBCUTANEOUS
Qty: 10 ML | Refills: 0 | Status: CANCELLED | OUTPATIENT
Start: 2024-01-01

## 2024-01-01 RX ORDER — BLOOD SUGAR DIAGNOSTIC
STRIP MISCELLANEOUS
Qty: 100 EACH | Refills: 0 | Status: CANCELLED | OUTPATIENT
Start: 2024-01-01

## 2024-01-01 RX ORDER — GUAIFENESIN 1200 MG/1
1200 TABLET, EXTENDED RELEASE ORAL EVERY 12 HOURS SCHEDULED
Qty: 14 TABLET | Refills: 0 | Status: CANCELLED | OUTPATIENT
Start: 2024-01-01 | End: 2024-01-08

## 2024-01-01 RX ADMIN — INSULIN LISPRO 10 UNITS: 100 INJECTION, SOLUTION INTRAVENOUS; SUBCUTANEOUS at 17:14

## 2024-01-01 RX ADMIN — LOSARTAN POTASSIUM 50 MG: 50 TABLET, FILM COATED ORAL at 08:36

## 2024-01-01 RX ADMIN — BENZOCAINE 1 APPLICATION: 220 GEL, DENTIFRICE DENTAL at 06:05

## 2024-01-01 RX ADMIN — INSULIN LISPRO 10 UNITS: 100 INJECTION, SOLUTION INTRAVENOUS; SUBCUTANEOUS at 08:38

## 2024-01-01 RX ADMIN — CHLORHEXIDINE GLUCONATE 15 ML: 1.2 SOLUTION ORAL at 08:36

## 2024-01-01 RX ADMIN — INSULIN LISPRO 10 UNITS: 100 INJECTION, SOLUTION INTRAVENOUS; SUBCUTANEOUS at 11:54

## 2024-01-01 RX ADMIN — INSULIN LISPRO 4 UNITS: 100 INJECTION, SOLUTION INTRAVENOUS; SUBCUTANEOUS at 11:53

## 2024-01-01 RX ADMIN — INSULIN LISPRO 1 UNITS: 100 INJECTION, SOLUTION INTRAVENOUS; SUBCUTANEOUS at 17:13

## 2024-01-01 RX ADMIN — INSULIN LISPRO 1 UNITS: 100 INJECTION, SOLUTION INTRAVENOUS; SUBCUTANEOUS at 22:01

## 2024-01-01 RX ADMIN — INSULIN GLARGINE 30 UNITS: 100 INJECTION, SOLUTION SUBCUTANEOUS at 08:36

## 2024-01-01 RX ADMIN — INSULIN LISPRO 5 UNITS: 100 INJECTION, SOLUTION INTRAVENOUS; SUBCUTANEOUS at 08:37

## 2024-01-01 RX ADMIN — POTASSIUM CHLORIDE 60 MEQ: 1500 TABLET, EXTENDED RELEASE ORAL at 19:35

## 2024-01-01 RX ADMIN — ACETAMINOPHEN 650 MG: 325 TABLET, FILM COATED ORAL at 04:42

## 2024-01-01 NOTE — QUICK NOTE
Glycemic trends reviewed.  Patient hyperglycemic in the 300s today.  Patient appears to have refused the 30 units of Lantus yesterday when transitioning off of insulin infusion.  Did receive 30 units of Lantus this morning, has been receiving mealtime and correctional insulin.      Maintain current regimen of Lantus 30 units daily, Lispro 10 units 3 times daily before every meal, Algorithm 3/2 correctional scale.

## 2024-01-01 NOTE — PLAN OF CARE
Problem: PAIN - ADULT  Goal: Verbalizes/displays adequate comfort level or baseline comfort level  Description: Interventions:  - Encourage patient to monitor pain and request assistance  - Assess pain using appropriate pain scale  - Administer analgesics based on type and severity of pain and evaluate response  - Implement non-pharmacological measures as appropriate and evaluate response  - Consider cultural and social influences on pain and pain management  - Notify physician/advanced practitioner if interventions unsuccessful or patient reports new pain  Outcome: Progressing     Problem: INFECTION - ADULT  Goal: Absence or prevention of progression during hospitalization  Description: INTERVENTIONS:  - Assess and monitor for signs and symptoms of infection  - Monitor lab/diagnostic results  - Monitor all insertion sites, i.e. indwelling lines, tubes, and drains  - Monitor endotracheal if appropriate and nasal secretions for changes in amount and color  - Bates appropriate cooling/warming therapies per order  - Administer medications as ordered  - Instruct and encourage patient and family to use good hand hygiene technique  - Identify and instruct in appropriate isolation precautions for identified infection/condition  Outcome: Progressing  Goal: Absence of fever/infection during neutropenic period  Description: INTERVENTIONS:  - Monitor WBC    Outcome: Progressing     Problem: SAFETY ADULT  Goal: Patient will remain free of falls  Description: INTERVENTIONS:  - Educate patient/family on patient safety including physical limitations  - Instruct patient to call for assistance with activity   - Consult OT/PT to assist with strengthening/mobility   - Keep Call bell within reach  - Keep bed low and locked with side rails adjusted as appropriate  - Keep care items and personal belongings within reach  - Initiate and maintain comfort rounds  - Make Fall Risk Sign visible to staff  - Offer Toileting every  Hours,  in advance of need  - Initiate/Maintain alarm  - Obtain necessary fall risk management equipment:   - Apply yellow socks and bracelet for high fall risk patients  - Consider moving patient to room near nurses station  Outcome: Progressing  Goal: Maintain or return to baseline ADL function  Description: INTERVENTIONS:  -  Assess patient's ability to carry out ADLs; assess patient's baseline for ADL function and identify physical deficits which impact ability to perform ADLs (bathing, care of mouth/teeth, toileting, grooming, dressing, etc.)  - Assess/evaluate cause of self-care deficits   - Assess range of motion  - Assess patient's mobility; develop plan if impaired  - Assess patient's need for assistive devices and provide as appropriate  - Encourage maximum independence but intervene and supervise when necessary  - Involve family in performance of ADLs  - Assess for home care needs following discharge   - Consider OT consult to assist with ADL evaluation and planning for discharge  - Provide patient education as appropriate  Outcome: Progressing  Goal: Maintains/Returns to pre admission functional level  Description: INTERVENTIONS:  - Perform AM-PAC 6 Click Basic Mobility/ Daily Activity assessment daily.  - Set and communicate daily mobility goal to care team and patient/family/caregiver.   - Collaborate with rehabilitation services on mobility goals if consulted  - Perform Range of Motion  times a day.  - Reposition patient every  hours.  - Dangle patient  times a day  - Stand patient  times a day  - Ambulate patient  times a day  - Out of bed to chair  times a day   - Out of bed for meals  times a day  - Out of bed for toileting  - Record patient progress and toleration of activity level   Outcome: Progressing     Problem: DISCHARGE PLANNING  Goal: Discharge to home or other facility with appropriate resources  Description: INTERVENTIONS:  - Identify barriers to discharge w/patient and caregiver  - Arrange for  needed discharge resources and transportation as appropriate  - Identify discharge learning needs (meds, wound care, etc.)  - Arrange for interpretive services to assist at discharge as needed  - Refer to Case Management Department for coordinating discharge planning if the patient needs post-hospital services based on physician/advanced practitioner order or complex needs related to functional status, cognitive ability, or social support system  Outcome: Progressing     Problem: Knowledge Deficit  Goal: Patient/family/caregiver demonstrates understanding of disease process, treatment plan, medications, and discharge instructions  Description: Complete learning assessment and assess knowledge base.  Interventions:  - Provide teaching at level of understanding  - Provide teaching via preferred learning methods  Outcome: Progressing

## 2024-01-01 NOTE — PROGRESS NOTES
"    INTERNAL MEDICINE RESIDENCY PROGRESS NOTE     Name: Benitez White   Age & Sex: 38 y.o. male   MRN: [id number]  [facility]   Encounter: [id number]  Team: SOD Team B     PATIENT INFORMATION     Name: Benitez White   Age & Sex: 38 y.o. male   MRN: [id number]  Hospital Stay Days: 2    ASSESSMENT/PLAN     Principal Problem:    DKA (diabetic ketoacidosis) ([facility])  Active Problems:    RSV (respiratory syncytial virus infection)    Hypertension      Hypertension  Assessment & Plan  Patient with elevated BP throughout admission with SBP up to 180. Patient reports history of hypertension in the past and was previously on antihypertensives however he self-discontinued. He is hesitant to starting antihypertensive therapy due to side effects and reports \"I'll probably stop taking it.\" He is agreeable at this time to starting losartan 50 mg daily.    Plan:  Continue Losartan 50mg daily    RSV (respiratory syncytial virus infection)  Assessment & Plan  Continue Mucinex    * DKA (diabetic ketoacidosis) ([facility])  Assessment & Plan  Lab Results   Component Value Date    HGBA1C 14.4 (H) 12/30/2023       Recent Labs     12/31/23  1602 12/31/23  2121 01/01/24  0554 01/01/24  1053   POCGLU 209* 218* 320* 295*       Blood Sugar Average: Last 72 hrs:  (P) [id number]    38-year-old male with new onset DM presented in DKA. Anion gap originally 23, CO2 19. Most recent BMP with AG 8 and CO2 25.  Gap closed while on DKA protocol.  Endocrinology has seen patient, appreciate recs outlined below.    Plan:  --Continue Lantus 30U qHS   --Start 10 units of lispro 3 times daily before every meal  --Start Algorithm 3/2 correction scale  --Dietitian evaluation  --Will need insulin and glucometer training  --Anticipate discharge on insulin  --Hypoglycemia protocol  -- Provided contact for endocrine fellow clinic, would recommend follow-up in 2 to 4 weeks  -- Antibody evaluation for type 1 diabetes    Increased anion gap metabolic " acidosis-resolved as of 2024  Assessment & Plan  Anion gap originally 23, CO2 19. Most recent BMP with AG 8 and CO2 25. S/P 3 L Isolyte in the ED plus IVF per DKA protocol. Anion gap resolved on Insulin gtt.    Plan:  Continue to monitor        Disposition: Pending optimization of BP and diabetes treatment.    SUBJECTIVE     Patient seen and examined. No acute events overnight. Patient reports feeling well with no complaints. Denies chest pain, shortness of breath, fever, chills, or any other symptoms. Patient educated extensively on diabetes today.    OBJECTIVE     Vitals:    23 2303 24 0515 24 0600 24 0733   BP: (!) 171/112 (!) 182/115  (!) 175/93   BP Location:    Left arm   Pulse: 66 67     Resp: 18   17   Temp:    98.3 °F (36.8 °C)   TempSrc:    Oral   SpO2: 97% 96%  97%   Weight:   106 kg (234 lb 9.1 oz)    Height:          Temperature:   Temp (24hrs), Av.9 °F (36.6 °C), Min:97.4 °F (36.3 °C), Max:98.3 °F (36.8 °C)    Temperature: 98.3 °F (36.8 °C)  Intake & Output:  I/O          07 07 0701   0700  07 0700    P.O. 640 440     I.V. (mL/kg) 5876.7 (56.5) 280.5 (2.6)     Total Intake(mL/kg) 6516.7 (62.7) 720.5 (6.8)     Urine (mL/kg/hr) 500 0 (0)     Stool 0      Total Output 500 0     Net +6016.7 +720.5            Unmeasured Urine Occurrence 4 x 7 x     Unmeasured Stool Occurrence 1 x            Weights:   IBW (Ideal Body Weight): 68.4 kg    Body mass index is 35.67 kg/m².  Weight (last 2 days)       Date/Time Weight    24 0600 106 (234.57)    23 1127 104 (230)    23 0600 104 (230.38)    12/30/23 1446 104 (228.84)          Physical Exam  Vitals and nursing note reviewed.   Constitutional:       General: He is not in acute distress.     Appearance: He is well-developed.   HENT:      Head: Normocephalic and atraumatic.   Eyes:      Conjunctiva/sclera: Conjunctivae normal.   Cardiovascular:      Rate and Rhythm: Normal rate  and regular rhythm.      Heart sounds: No murmur heard.  Pulmonary:      Effort: Pulmonary effort is normal. No respiratory distress.      Breath sounds: Normal breath sounds.   Abdominal:      Palpations: Abdomen is soft.      Tenderness: There is no abdominal tenderness.   Musculoskeletal:         General: No swelling.      Cervical back: Neck supple.   Skin:     General: Skin is warm and dry.      Capillary Refill: Capillary refill takes less than 2 seconds.   Neurological:      Mental Status: He is alert.   Psychiatric:         Mood and Affect: Mood normal.       LABORATORY DATA     Labs: I have personally reviewed pertinent reports.  Results from last 7 days   Lab Units 01/01/24  1038 12/31/23  0437 12/30/23  1137   WBC Thousand/uL 8.38 10.25* 11.39*   HEMOGLOBIN g/dL 12.8 11.5* 14.5   HEMATOCRIT % 37.9 34.8* 44.6   PLATELETS Thousands/uL 212 210  --    NEUTROS PCT %  --   --  76*   MONOS PCT %  --   --  5   EOS PCT %  --   --  0      Results from last 7 days   Lab Units 01/01/24  1122 12/31/23  0437 12/31/23  0044 12/30/23  1644 12/30/23  1148   POTASSIUM mmol/L 3.3* 3.6 3.7   < > 4.8   CHLORIDE mmol/L 98 104 103   < > 92*   CO2 mmol/L 27 26 25   < > 19*   BUN mg/dL 11 13 13   < > 22   CREATININE mg/dL 0.73 0.72 0.77   < > 1.16   CALCIUM mg/dL 8.7 8.5 8.3*   < > 10.2   ALK PHOS U/L 91 85  --   --  145*   ALT U/L 22 20  --   --  28   AST U/L 19 19  --   --  16    < > = values in this interval not displayed.     Results from last 7 days   Lab Units 01/01/24  1038 12/31/23  0437 12/31/23  0044   MAGNESIUM mg/dL 1.8* 1.9 1.8*     Results from last 7 days   Lab Units 12/31/23  0437 12/31/23  0044 12/30/23  2049   PHOSPHORUS mg/dL 2.7 2.8 2.7                    IMAGING & DIAGNOSTIC TESTING     Radiology Results: I have personally reviewed pertinent reports.  No results found.  Other Diagnostic Testing: I have personally reviewed pertinent reports.    ACTIVE MEDICATIONS     Current Facility-Administered Medications  "  Medication Dose Route Frequency    acetaminophen (TYLENOL) tablet 650 mg  650 mg Oral Q6H PRN    chlorhexidine (PERIDEX) 0.12 % oral rinse 15 mL  15 mL Mouth/Throat Q12H [place]    enoxaparin (LOVENOX) subcutaneous injection 40 mg  40 mg Subcutaneous Daily    guaiFENesin (MUCINEX) 12 hr tablet 1,200 mg  1,200 mg Oral Q12H [place]    insulin glargine (LANTUS) subcutaneous injection 30 Units 0.3 mL  30 Units Subcutaneous HS    insulin lispro (HumaLOG) 100 units/mL subcutaneous injection 1-5 Units  1-5 Units Subcutaneous HS    insulin lispro (HumaLOG) 100 units/mL subcutaneous injection 1-6 Units  1-6 Units Subcutaneous TID AC    insulin lispro (HumaLOG) 100 units/mL subcutaneous injection 10 Units  10 Units Subcutaneous TID With Meals    losartan (COZAAR) tablet 50 mg  50 mg Oral Daily       VTE Pharmacologic Prophylaxis: Enoxaparin (Lovenox)  VTE Mechanical Prophylaxis: sequential compression device    Portions of the record may have been created with voice recognition software.  Occasional wrong word or \"sound a like\" substitutions may have occurred due to the inherent limitations of voice recognition software.  Read the chart carefully and recognize, using context, where substitutions have occurred.  ==  Eileen Archer MD  [facility]  Internal Medicine Residency PGY-3      "

## 2024-01-01 NOTE — UTILIZATION REVIEW
Initial Clinical Review    Admission: Date/Time/Statement:   Admission Orders (From admission, onward)       Ordered        12/30/23 1342  INPATIENT ADMISSION  Once                          Orders Placed This Encounter   Procedures    INPATIENT ADMISSION     Standing Status:   Standing     Number of Occurrences:   1     Order Specific Question:   Level of Care     Answer:   Level 1 Stepdown [13]     Order Specific Question:   Estimated length of stay     Answer:   More than 2 Midnights     Order Specific Question:   Certification     Answer:   I certify that inpatient services are medically necessary for this patient for a duration of greater than two midnights. See H&P and MD Progress Notes for additional information about the patient's course of treatment.     ED Arrival Information       Expected   -    Arrival   12/30/2023 10:22    Acuity   Urgent              Means of arrival   Walk-In    Escorted by   Family Member    Service   [facility]    Admission type   Emergency              Arrival complaint   fatigued             Chief Complaint   Patient presents with    Fatigue     For the past week having fatigue, increased urinary frequency/urgency, cloudy/dark urine, and no erection. Pt denies hematuria, lesions in penile area or discharge.     Facial Swelling     Pt noticed lesion on right eye with drainage        Initial Presentation: 38 y.o. male who presented  to [facility] ED. Inpatient admission for evaluation and treatment of DKA (diabetic ketoacidosis).  PMHx:  has no past medical history on file.  Presented w/  1 week of worsening general weakness. He c/o urinary frequency,( going  every hour),  increased thirst and decreased appetite. He reports smoking 1 PPD for most of his life, he reports hequit smoking and vaping about 3 months ago.         Date: 12/31/2023  Day 2:    He reports feeling fine today. He is hemodynamically stable and afebrile. Electrolytes were within  normal limits.   Transition off insulin gtt to Insulin as per Endocrine.   Dietician iwona, DM teaching, Anticipate d/c on Insulin. Hypoglycemic protocol  OP follow up  with  endocrine.  Antibody evaluation for Type 1 DM.           Endocrine Consult - 12/31 - 39 yo m without pertinent PMH, presents with nausea, vomiting, unable to keep anything down. He was found to be hyperglycemic with HAGMA and is admitted for DKA.   He is on an insulin gtt, Recommend transition to Lantus 30 units sc HS, Humalog 10 units with meals tid, correctional sliding scale insulin.   D/C IV insulin 1 hr after 1st dose of Lantus.   Insulin teaching, Dietician consult.   To monitor BS and make adjustments if necessary.        Date 1/1/24 Day 3 -  He remains hyperglycemic 300's, it appears he refused the 30 unit s of Lantus yesterday  when transitioned off the insulin gtt. He did receive 30 U Lantus this am and hs been receiving mealtime and correctional insulin.  Continue current regimen and monitor.   Hypertension - SBP up to 180. Hx of HTN in the past  was previously on meds. He self discontinued. He agreed to start losartan but reports he will probably stop taking it.   DM education continues.     ED Triage Vitals   Temperature Pulse Respirations Blood Pressure SpO2   12/30/23 1029 12/30/23 1029 12/30/23 1029 12/30/23 1029 12/30/23 1029   97.6 °F (36.4 °C) 99 17 (!) 174/103 100 %      Temp Source Heart Rate Source Patient Position - Orthostatic VS BP Location FiO2 (%)   12/30/23 1029 12/30/23 1029 12/31/23 1553 12/30/23 1029 --   Oral Monitor Sitting Left arm       Pain Score       12/30/23 1029       No Pain          Wt Readings from Last 1 Encounters:   01/01/24 106 kg (234 lb 9.1 oz)     Additional Vital Signs:   Date/Time Temp Pulse Resp BP MAP (mmHg) SpO2 O2 Device Patient Position - Orthostatic VS   01/01/24 0733 98.3 °F (36.8 °C) -- 17 175/93 Abnormal  120 97 % -- Lying   01/01/24 05:15:20 -- 67 -- 182/115 Abnormal  137 96 % -- --    12/31/23 23:03:27 -- 66 18 171/112 Abnormal  132 97 % -- --   12/31/23 2000 -- -- -- -- -- 97 % None (Room air) --   12/31/23 15:53:38 97.4 °F (36.3 °C) Abnormal  58 19 172/109 Abnormal  130 99 % None (Room air) Sitting   12/31/23 1225 -- 91 20 172/90 Abnormal  124 95 % None (Room air) --   12/31/23 1127 -- 58 19 141/78 -- -- -- --   12/31/23 0800 98.5 °F (36.9 °C) 62 22 141/78 105 97 % None (Room air) --   12/31/23 0600 -- 58 21 -- -- 97 % -- --   12/31/23 0437 -- 51 Abnormal  15 137/77 102 96 % -- --   12/31/23 0400 98.5 °F (36.9 °C) 60 18 -- -- 97 % -- --   12/31/23 0152 -- 65 23 Abnormal  -- -- 97 % -- --   12/31/23 0100 -- 60 12 -- -- -- -- --   12/31/23 0000 -- 65 12 -- -- 96 % -- --   12/30/23 2300 -- 62 17 144/85 110 96 % -- --   12/30/23 2105 -- 62 19 155/93 119 97 % -- --   12/30/23 2030 98 °F (36.7 °C) 61 26 Abnormal  176/101 Abnormal  125 98 % -- --   12/30/23 1930 -- 64 20 149/88 113 98 % None (Room air) --   12/30/23 1830 -- 59 19 -- -- 98 % None (Room air) --   12/30/23 1700 -- 66 18 170/85 118 99 % None (Room air) --   12/30/23 1545 -- 76 17 -- -- 99 % None (Room air) --   12/30/23 1457 -- 95 18 168/90 121 98 % None (Room air) --   12/30/23 1345 -- 70 20 180/89 Abnormal  128 100 % None (Room air) --   12/30/23 1237 -- 79 19 148/88 113 100 % None (Room air) --             Pertinent Labs/Diagnostic Test Results:   No orders to display     Results from last 7 days   Lab Units 12/30/23  1349   SARS-COV-2  Negative     Results from last 7 days   Lab Units 01/01/24  1038 12/31/23  0437 12/30/23  1137   WBC Thousand/uL 8.38 10.25* 11.39*   HEMOGLOBIN g/dL 12.8 11.5* 14.5   HEMATOCRIT % 37.9 34.8* 44.6   PLATELETS Thousands/uL 212 210  --    NEUTROS ABS Thousands/µL  --   --  8.72*         Results from last 7 days   Lab Units 01/01/24  1122 01/01/24  1038 12/31/23  0437 12/31/23  0044 12/30/23  2049 12/30/23  1644 12/30/23  1148   SODIUM mmol/L 133*  --  137 136 137 135 134*   POTASSIUM mmol/L 3.3*  --   3.6 3.7 3.5 3.7 4.8   CHLORIDE mmol/L 98  --  104 103 104 101 92*   CO2 mmol/L 27  --  26 25 26 19* 19*   ANION GAP mmol/L 8  --  7 8 7 15 23   BUN mg/dL 11  --  13 13 15 18 22   CREATININE mg/dL 0.73  --  0.72 0.77 0.77 0.85 1.16   EGFR ml/min/1.73sq m 116  --  117 114 114 109 78   CALCIUM mg/dL 8.7  --  8.5 8.3* 7.9* 8.4 10.2   MAGNESIUM mg/dL  --  1.8* 1.9 1.8* 1.9 1.9 2.1   PHOSPHORUS mg/dL  --   --  2.7 2.8 2.7 3.6 6.6*     Results from last 7 days   Lab Units 01/01/24  1122 12/31/23  0437 12/30/23  1148   AST U/L 19 19 16   ALT U/L 22 20 28   ALK PHOS U/L 91 85 145*   TOTAL PROTEIN g/dL 6.0* 5.5* 7.8   ALBUMIN g/dL 3.5 3.2* 4.4   TOTAL BILIRUBIN mg/dL 0.92 1.11* 1.02*     Results from last 7 days   Lab Units 01/01/24  1053 01/01/24  0554 12/31/23  2121 12/31/23  1602 12/31/23  1158 12/31/23  1000 12/31/23  0840 12/31/23  0633 12/31/23  0435 12/31/23  0203 12/31/23  0046 12/30/23  2300   POC GLUCOSE mg/dl 295* 320* 218* 209* 138 202* 190* 101 127 137 227* 174*     Results from last 7 days   Lab Units 01/01/24  1122 12/31/23  0437 12/31/23  0044 12/30/23  2049 12/30/23  1644 12/30/23  1148   GLUCOSE RANDOM mg/dL 312* 116 218* 164* 282* 579*         Results from last 7 days   Lab Units 12/30/23  1433   HEMOGLOBIN A1C % 14.4*   EAG mg/dl 367     BETA-HYDROXYBUTYRATE   Date Value Ref Range Status   12/30/2023 5.8 (H) <0.6 mmol/L Final          Results from last 7 days   Lab Units 12/30/23  1148   PH SULAIMAN  7.348   PCO2 SULAIMAN mm Hg 33.7*   PO2 SULAIMAN mm Hg 67.4*   HCO3 SULAIMAN mmol/L 18.1*   BASE EXC SULAIMAN mmol/L -6.4   O2 CONTENT SULAIMAN ml/dL 19.8   O2 HGB, VENOUS % 89.3*       Results from last 7 days   Lab Units 12/31/23  0636 12/31/23  0437 12/31/23  0233   HS TNI 0HR ng/L  --   --  53*   HS TNI 2HR ng/L  --  52*  --    HSTNI D2 ng/L  --  -1  --    HS TNI 4HR ng/L 48  --   --    HSTNI D4 ng/L -5  --   --      Results from last 7 days   Lab Units 12/31/23  0437   TSH 3RD GENERATON uIU/mL 1.479       Results from last 7 days    Lab Units 12/30/23  1110   CLARITY UA  Clear   COLOR UA  Light Yellow   SPEC GRAV UA  1.034*   PH UA  5.0   GLUCOSE UA mg/dl >=1000 (1%)*   KETONES UA mg/dl 100 (3+)*   BLOOD UA  Trace*   PROTEIN UA mg/dl Trace*   NITRITE UA  Negative   BILIRUBIN UA  Negative   UROBILINOGEN UA (BE) mg/dl <2.0   LEUKOCYTES UA  Elevated glucose may cause decreased leukocyte values. See urine microscopic for UWBC result*   WBC UA /hpf 1-2   RBC UA /hpf None Seen   BACTERIA UA /hpf Occasional   EPITHELIAL CELLS WET PREP /hpf Occasional   MUCUS THREADS  Occasional*     Results from last 7 days   Lab Units 12/30/23  1349   INFLUENZA A PCR  Negative   INFLUENZA B PCR  Negative   RSV PCR  Positive*         ED Treatment:   Medication Administration from 12/30/2023 1022 to 12/30/2023 2016         Date/Time Order Dose Route Action Comments     12/30/2023 1203 EST multi-electrolyte (ISOLYTE-S PH 7.4) bolus 1,000 mL 1,000 mL Intravenous New Bag --     12/30/2023 1202 EST multi-electrolyte (ISOLYTE-S PH 7.4) bolus 1,000 mL 1,000 mL Intravenous New Bag --     12/30/2023 1933 EST insulin regular (HumuLIN R,NovoLIN R) 1 Units/mL in sodium chloride 0.9 % 100 mL infusion 8 Units/hr Intravenous Rate/Dose Change --     12/30/2023 1904 EST insulin regular (HumuLIN R,NovoLIN R) 1 Units/mL in sodium chloride 0.9 % 100 mL infusion 12 Units/hr Intravenous New Bag --     12/30/2023 1840 EST insulin regular (HumuLIN R,NovoLIN R) 1 Units/mL in sodium chloride 0.9 % 100 mL infusion 12 Units/hr Intravenous Rate/Dose Change --     12/30/2023 1742 EST insulin regular (HumuLIN R,NovoLIN R) 1 Units/mL in sodium chloride 0.9 % 100 mL infusion 14 Units/hr Intravenous Rate/Dose Change --     12/30/2023 1536 EST insulin regular (HumuLIN R,NovoLIN R) 1 Units/mL in sodium chloride 0.9 % 100 mL infusion 15 Units/hr Intravenous Rate/Dose Change --     12/30/2023 1443 EST insulin regular (HumuLIN R,NovoLIN R) 1 Units/mL in sodium chloride 0.9 % 100 mL infusion 16 Units/hr  Intravenous Rate/Dose Change --     12/30/2023 1334 EST insulin regular (HumuLIN R,NovoLIN R) 1 Units/mL in sodium chloride 0.9 % 100 mL infusion 18 Units/hr Intravenous New Bag --     12/30/2023 1427 EST multi-electrolyte (ISOLYTE-S PH 7.4) bolus 1,000 mL 1,000 mL Intravenous New Bag --     12/30/2023 1651 EST chlorhexidine (PERIDEX) 0.12 % oral rinse 15 mL 15 mL Mouth/Throat Given --     12/30/2023 1636 EST enoxaparin (LOVENOX) subcutaneous injection 40 mg 40 mg Subcutaneous Not Given --     12/30/2023 2002 EST multi-electrolyte (PLASMALYTE-A/ISOLYTE-S PH 7.4) IV solution 250 mL/hr Intravenous New Bag --     12/30/2023 1550 EST multi-electrolyte (PLASMALYTE-A/ISOLYTE-S PH 7.4) IV solution 250 mL/hr Intravenous New Bag --     12/30/2023 1756 EST dextrose 5 % in lactated Ringer's infusion 250 mL/hr Intravenous New Bag --     12/30/2023 1539 EST dextrose 5 % in lactated Ringer's infusion 0 mL/hr Intravenous Hold --          History reviewed. No pertinent past medical history.  Present on Admission:   DKA (diabetic ketoacidosis) (HCC)   (Resolved) Increased anion gap metabolic acidosis   RSV (respiratory syncytial virus infection)      Admitting Diagnosis: Fatigue [R53.83]  DKA (diabetic ketoacidosis) (HCC) [E11.10]  Age/Sex: 38 y.o. male        Admission Orders:  Scheduled Medications:  chlorhexidine, 15 mL, Mouth/Throat, Q12H [place]  enoxaparin, 40 mg, Subcutaneous, Daily  guaiFENesin, 1,200 mg, Oral, Q12H [place]  insulin glargine, 30 Units, Subcutaneous, HS  insulin lispro, 1-5 Units, Subcutaneous, HS  insulin lispro, 1-6 Units, Subcutaneous, TID AC  insulin lispro, 10 Units, Subcutaneous, TID With Meals  losartan, 50 mg, Oral, Daily- started 1/1  K dur 40 meq po once- 12/30 x 1 - 12/31 x 1       Continuous IV Infusions:     dextrose 5 % in lactated Ringer's infusion  Rate: 100 mL/hr Dose: 100 mL/hr  Freq: Continuous Route: IV  Last Dose: Stopped (12/31/23 0856)  Start: 12/30/23 1415 End: 12/31/23 0943     insulin  regular (HumuLIN R,NovoLIN R) 1 Units/mL in sodium chloride 0.9 % 100 mL infusion  Rate: 0.3-21 mL/hr Dose: 0.3-21 Units/hr  Freq: Titrated Route: IV  Last Dose: Stopped (12/31/23 1216)  Start: 12/30/23 1245 End: 12/31/23 1235     multi-electrolyte (PLASMALYTE-A/ISOLYTE-S PH 7.4) IV solution  Rate: 250 mL/hr Dose: 250 mL/hr  Freq: Continuous Route: IV  Last Dose: Stopped (12/30/23 2040)  Start: 12/30/23 1415 End: 12/30/23 2033         PRN Meds:  acetaminophen, 650 mg, Oral, Q6H PRN - 1/1 x 1           Network Utilization Review Department  ATTENTION: Please call with any questions or concerns to [phone] and carefully listen to the prompts so that you are directed to the right person. All voicemails are confidential.   For Discharge needs, contact Care Management DC Support Team at [phone] opt. [phone]  Send all requests for admission clinical reviews, approved or denied determinations and any other requests to dedicated fax number below belonging to the campus where the patient is receiving treatment. List of dedicated fax numbers for the Facilities:  FACILITY NAME UR FAX NUMBER   ADMISSION DENIALS (Administrative/Medical Necessity) [phone]   DISCHARGE SUPPORT TEAM (NETWORK) [phone]   PARENT CHILD HEALTH (Maternity/NICU/Pediatrics) [phone]   [facility] [phone]   [facility] [phone]   [facility] [phone]   [facility] [phone]   [facility] [phone]   [facility] [phone]   [facility] [phone]   [facility] [phone]   [facility] [phone]   [facility] [phone]   [facility] [phone]

## 2024-01-01 NOTE — QUICK NOTE
"      INTERNAL MEDICINE RESIDENCY DISCHARGE SUMMARY     Benitez White   38 y.o. male  MRN: [id number]  Room/Bed: [facility] 523/[facility] 523-01     [facility]   Encounter: [id number]    Principal Problem:    DKA (diabetic ketoacidosis) (HCC)  Active Problems:    RSV (respiratory syncytial virus infection)    Hypertension      Hypertension  Assessment & Plan  Patient with elevated BP throughout admission with SBP up to 180. Patient reports history of hypertension in the past and was previously on antihypertensives however he self-discontinued. He is hesitant to starting antihypertensive therapy due to side effects and reports \"I'll probably stop taking it.\" He is agreeable at this time to starting losartan 50 mg daily.    Plan:  Continue Losartan 50mg daily    RSV (respiratory syncytial virus infection)  Assessment & Plan  Continue Mucinex    * DKA (diabetic ketoacidosis) ([facility])  Assessment & Plan  Lab Results   Component Value Date    HGBA1C 14.4 (H) 12/30/2023       Recent Labs     01/01/24  1629 01/01/24  2107 01/02/24  0732 01/02/24  1032   POCGLU 166* 190* 186* 289*       Blood Sugar Average: Last 72 hrs:  (P) 217.36    38-year-old male with new onset DM presented in DKA. Anion gap originally 23, CO2 19. Most recent BMP with AG 8 and CO2 25.  Gap closed while on DKA protocol.  Endocrinology has seen patient, appreciate recs outlined below.    Plan:  -- Switch to NPH 70/30 30 units twice daily with meals given lack of insurance  --Glucometer with supplies sent to pharmacy  --Dietitian evaluation  -- Provided contact for endocrine fellow clinic, would recommend follow-up in 2 to 4 weeks  -- Antibody evaluation for type 1 diabetes pending  --Follow-up with PCP clinic within 1 to 2 weeks  -- Statin indicated, however, will hold for now PCP address it. Patient feels overwhelmed given new diagnosis.    Increased anion gap metabolic acidosis-resolved as of " "1/1/2024  Assessment & Plan  Anion gap originally 23, CO2 19. Most recent BMP with AG 8 and CO2 25. S/P 3 L Isolyte in the ED plus IVF per DKA protocol. Anion gap resolved on Insulin gtt.    Plan:  Continue to monitor      Physical Exam  Vitals and nursing note reviewed.   Constitutional:       General: He is not in acute distress.     Appearance: He is well-developed.   HENT:      Head: Normocephalic and atraumatic.   Eyes:      Conjunctiva/sclera: Conjunctivae normal.   Cardiovascular:      Rate and Rhythm: Normal rate and regular rhythm.      Heart sounds: No murmur heard.  Pulmonary:      Effort: Pulmonary effort is normal. No respiratory distress.      Breath sounds: Normal breath sounds.   Abdominal:      Palpations: Abdomen is soft.      Tenderness: There is no abdominal tenderness.   Musculoskeletal:         General: No swelling.      Cervical back: Neck supple.   Skin:     General: Skin is warm and dry.      Capillary Refill: Capillary refill takes less than 2 seconds.   Neurological:      Mental Status: He is alert.   Psychiatric:         Mood and Affect: Mood normal.          DETAILS OF HOSPITAL COURSE     Per Dr. Ndiaye: \"38-year-old male without significant past medical history who presented with 1 week of worsening general weakness and urinary frequency with increased thirst and decreased appetite.  Patient states that he has been urinating about every hour.  No family history of DM.     Patient diagnosed with DKA and treated in the ICU.  Original fully beta hydroxybutyrate was elevated at 5.8, anion gap was 23 with CO2 of 19.  Resolved with IV fluids and insulin gtt per DKA protocol.  Patient was evaluated by endocrinology who recommended transition from insulin drip to subcutaneous insulin and sliding scale.  Patient's electrolytes were monitored and repleted as necessary.  Of note, patient also had complaint of cold-like symptoms and diagnosed with RSV.  He is mostly asymptomatic except for reported " "chest congestion.  Mucinex was ordered.\"    Patient was seen and evaluated today.  Patient reports feeling well with no complaints.  Patient is willing to establish care with [facility]. Clinical team messaged to schedule TCM appointment.  Insulin and glucometer kit sent to patient pharmacy.  Patient was switched to NPH 70/30 insulin 30 units twice daily with meals given lack of insurance. Insulin administration education done prior to discharge.  Patient will be following with clinic within 1 to 2 weeks.    DISCHARGE INFORMATION     PCP at Discharge: None    Admitting Provider: Anthony Silva MD  Admission Date: 12/30/2023    Discharge Provider: Tatiana Walton MD  Discharge Date: 1/2/2024    Discharge Disposition: Home/Self Care  Discharge Condition: good  Discharge with Lines: no    Discharge Diet: diabetic diet  Activity Restrictions: none  Test Results Pending at Discharge: DM type 1 workup    Discharge Diagnoses:  Principal Problem:    DKA (diabetic ketoacidosis) ([facility])  Active Problems:    RSV (respiratory syncytial virus infection)    Hypertension  Resolved Problems:    Increased anion gap metabolic acidosis      Consulting Providers:      Diagnostic & Therapeutic Procedures Performed:  No results found.    Code Status: Level 1 - Full Code  Advance Directive & Living Will: <no information>  Power of :    POLST:      Medications:  Current Discharge Medication List        STOP taking these medications       ketorolac (ACULAR) 0.5 % ophthalmic solution Comments:   Reason for Stopping:         naproxen (Naprosyn) 500 mg tablet Comments:   Reason for Stopping:             Current Discharge Medication List        START taking these medications    Details   Alcohol Swabs 70 % PADS May substitute brand based on insurance coverage. Check glucose TID.  Qty: 100 each, Refills: 0    Associated Diagnoses: DKA (diabetic ketoacidosis) ([facility])      Blood Glucose Monitoring Suppl (OneTouch Verio " "Reflect) w/Device KIT May substitute brand based on insurance coverage. Check glucose TID.  Qty: 1 kit, Refills: 0    Associated Diagnoses: DKA (diabetic ketoacidosis) ([facility])      glucose blood (OneTouch Verio) test strip May substitute brand based on insurance coverage. Check glucose TID.  Qty: 100 each, Refills: 0    Associated Diagnoses: DKA (diabetic ketoacidosis) ([facility])      insulin NPH (NovoLIN N) 100 Units/mL subcutaneous injection Inject 30 Units under the skin 2 (two) times a day before meals  Qty: 10 mL, Refills: 0    Associated Diagnoses: DKA (diabetic ketoacidosis) ([facility])      Insulin Syringe-Needle U-100 (BD Insulin Syringe U/F) 31G X 5/16\" 0.3 ML MISC For use with insulin. Pharmacy may dispense brand covered by insurance.  Qty: 100 each, Refills: 0    Associated Diagnoses: DKA (diabetic ketoacidosis) ([facility])      losartan (COZAAR) 50 mg tablet Take 1 tablet (50 mg total) by mouth daily  Qty: 30 tablet, Refills: 0    Associated Diagnoses: DKA (diabetic ketoacidosis) ([facility]); Hypertension      OneTouch Delica Lancets 33G MISC May substitute brand based on insurance coverage. Check glucose TID.  Qty: 100 each, Refills: 0    Associated Diagnoses: DKA (diabetic ketoacidosis) ([facility])           Current Discharge Medication List          Allergies:  Allergies   Allergen Reactions    Nuts - Food Allergy      Wilder       FOLLOW-UP     PCP Outpatient Follow-up:  Will schedule appointment within 1-2 weeks.    Active Issues Requiring Follow-up:   Diabetes    Discharge Statement:   I spent 30 minutes minutes discharging the patient. This time was spent on the day of discharge. I had direct contact with the patient on the day of discharge. Additional documentation is required if more than 30 minutes were spent on discharge.    Portions of the record may have been created with voice recognition software.  Occasional wrong word or \"sound a like\" substitutions may have occurred due to the inherent limitations of voice " recognition software.  Read the chart carefully and recognize, using context, where substitutions have occurred.    ==  Eileen Archer MD  [facility]  Internal Medicine Resident PGY-3

## 2024-01-02 ENCOUNTER — TELEPHONE (OUTPATIENT)
Dept: INTERNAL MEDICINE CLINIC | Facility: CLINIC | Age: 39
End: 2024-01-02

## 2024-01-02 VITALS
HEART RATE: 67 BPM | OXYGEN SATURATION: 96 % | SYSTOLIC BLOOD PRESSURE: 148 MMHG | HEIGHT: 68 IN | WEIGHT: 235.89 LBS | BODY MASS INDEX: 35.75 KG/M2 | RESPIRATION RATE: 17 BRPM | TEMPERATURE: 97.8 F | DIASTOLIC BLOOD PRESSURE: 87 MMHG

## 2024-01-02 LAB
ANION GAP SERPL CALCULATED.3IONS-SCNC: 9 MMOL/L
BASOPHILS # BLD AUTO: 0.05 THOUSANDS/ÂΜL (ref 0–0.1)
BASOPHILS NFR BLD AUTO: 1 % (ref 0–1)
BUN SERPL-MCNC: 12 MG/DL (ref 5–25)
CALCIUM SERPL-MCNC: 8.9 MG/DL (ref 8.4–10.2)
CHLORIDE SERPL-SCNC: 104 MMOL/L (ref 96–108)
CO2 SERPL-SCNC: 26 MMOL/L (ref 21–32)
CREAT SERPL-MCNC: 0.78 MG/DL (ref 0.6–1.3)
EOSINOPHIL # BLD AUTO: 0.07 THOUSAND/ÂΜL (ref 0–0.61)
EOSINOPHIL NFR BLD AUTO: 1 % (ref 0–6)
ERYTHROCYTE [DISTWIDTH] IN BLOOD BY AUTOMATED COUNT: 14 % (ref 11.6–15.1)
GFR SERPL CREATININE-BSD FRML MDRD: 113 ML/MIN/1.73SQ M
GLUCOSE SERPL-MCNC: 186 MG/DL (ref 65–140)
GLUCOSE SERPL-MCNC: 213 MG/DL (ref 65–140)
GLUCOSE SERPL-MCNC: 289 MG/DL (ref 65–140)
HCT VFR BLD AUTO: 38.7 % (ref 36.5–49.3)
HGB BLD-MCNC: 13 G/DL (ref 12–17)
IMM GRANULOCYTES # BLD AUTO: 0.03 THOUSAND/UL (ref 0–0.2)
IMM GRANULOCYTES NFR BLD AUTO: 0 % (ref 0–2)
LYMPHOCYTES # BLD AUTO: 2.87 THOUSANDS/ÂΜL (ref 0.6–4.47)
LYMPHOCYTES NFR BLD AUTO: 37 % (ref 14–44)
MCH RBC QN AUTO: 28.7 PG (ref 26.8–34.3)
MCHC RBC AUTO-ENTMCNC: 33.6 G/DL (ref 31.4–37.4)
MCV RBC AUTO: 85 FL (ref 82–98)
MONOCYTES # BLD AUTO: 0.77 THOUSAND/ÂΜL (ref 0.17–1.22)
MONOCYTES NFR BLD AUTO: 10 % (ref 4–12)
NEUTROPHILS # BLD AUTO: 4.03 THOUSANDS/ÂΜL (ref 1.85–7.62)
NEUTS SEG NFR BLD AUTO: 51 % (ref 43–75)
NRBC BLD AUTO-RTO: 0 /100 WBCS
PLATELET # BLD AUTO: 225 THOUSANDS/UL (ref 149–390)
PMV BLD AUTO: 13 FL (ref 8.9–12.7)
POTASSIUM SERPL-SCNC: 3.3 MMOL/L (ref 3.5–5.3)
RBC # BLD AUTO: 4.53 MILLION/UL (ref 3.88–5.62)
SODIUM SERPL-SCNC: 139 MMOL/L (ref 135–147)
WBC # BLD AUTO: 7.82 THOUSAND/UL (ref 4.31–10.16)

## 2024-01-02 PROCEDURE — 82948 REAGENT STRIP/BLOOD GLUCOSE: CPT

## 2024-01-02 PROCEDURE — 80048 BASIC METABOLIC PNL TOTAL CA: CPT

## 2024-01-02 PROCEDURE — 85025 COMPLETE CBC W/AUTO DIFF WBC: CPT

## 2024-01-02 RX ORDER — HUMAN INSULIN 100 [IU]/ML
30 INJECTION, SUSPENSION SUBCUTANEOUS
Qty: 10 ML | Refills: 0 | Status: SHIPPED | OUTPATIENT
Start: 2024-01-02

## 2024-01-02 RX ORDER — LOSARTAN POTASSIUM 50 MG/1
50 TABLET ORAL DAILY
Qty: 30 TABLET | Refills: 0 | Status: SHIPPED | OUTPATIENT
Start: 2024-01-02 | End: 2024-02-01

## 2024-01-02 RX ORDER — BLOOD SUGAR DIAGNOSTIC
STRIP MISCELLANEOUS
Qty: 100 EACH | Refills: 0 | Status: SHIPPED | OUTPATIENT
Start: 2024-01-02

## 2024-01-02 RX ORDER — BLOOD-GLUCOSE METER
KIT MISCELLANEOUS
Qty: 1 KIT | Refills: 0 | Status: SHIPPED | OUTPATIENT
Start: 2024-01-02

## 2024-01-02 RX ORDER — GLUCOSAMINE HCL/CHONDROITIN SU 500-400 MG
CAPSULE ORAL
Qty: 100 EACH | Refills: 0 | Status: SHIPPED | OUTPATIENT
Start: 2024-01-02

## 2024-01-02 RX ORDER — LANCETS 33 GAUGE
EACH MISCELLANEOUS
Qty: 100 EACH | Refills: 0 | Status: SHIPPED | OUTPATIENT
Start: 2024-01-02

## 2024-01-02 RX ADMIN — INSULIN LISPRO 4 UNITS: 100 INJECTION, SOLUTION INTRAVENOUS; SUBCUTANEOUS at 11:03

## 2024-01-02 RX ADMIN — LOSARTAN POTASSIUM 50 MG: 50 TABLET, FILM COATED ORAL at 08:02

## 2024-01-02 RX ADMIN — INSULIN LISPRO 1 UNITS: 100 INJECTION, SOLUTION INTRAVENOUS; SUBCUTANEOUS at 07:46

## 2024-01-02 RX ADMIN — INSULIN LISPRO 10 UNITS: 100 INJECTION, SOLUTION INTRAVENOUS; SUBCUTANEOUS at 07:54

## 2024-01-02 RX ADMIN — INSULIN LISPRO 10 UNITS: 100 INJECTION, SOLUTION INTRAVENOUS; SUBCUTANEOUS at 11:04

## 2024-01-02 NOTE — TELEPHONE ENCOUNTER
----- Message from Eileen Archer MD sent at 1/2/2024 12:13 PM EST -----  Regarding: TCM  Good morning Kelsy,    Mr. White is being discharged from the hospital today after been newly diagnosed with diabetes. Please schedule him for a TCM appointment within 1-2 weeks. Thank you!

## 2024-01-02 NOTE — NURSING NOTE
Case management dropped off patients new diabetes supplies.  Patient packed all belongings.  All discharge information given to patient.  All questions answered.

## 2024-01-02 NOTE — PROGRESS NOTES
Called Farhan Blanc, made him aware that patient is refusing blood work.  Patient is also becoming verbally abusive.

## 2024-01-02 NOTE — QUICK NOTE
Glycemic trends reviewed    Discharge recommendations:  Continue Lantus 30 units daily  Continue lispro 10 units before every meal  Endocrine clinic follow-up in 2 weeks  Antibody workup pending    Upon discharge, if Lantus is not the preferred basal insulin for the patient's insurance company, we can use Tresiba, Basaglar,  Toujeo at the same dose instead.     Upon discharge, if Humalog is not the preferred mealtime insulin for the patient's insurance, we can use NovoLog, Fiasp, or Apidra at the same dose instead.     If Novolin 70/30 is more affordable given work and insurance/affordability concerns, total daily dose of insulin would be the same split into twice a day with breakfast and dinner     Please prescribe insulin pen needles, glucometer, test strips, lancets and insulin syringes (only when using insulin vials) on discharge.

## 2024-01-02 NOTE — DISCHARGE INSTR - AVS FIRST PAGE
Insulin 70/30 30 units twice a day with breakfast and dinner.    Monitor your sugars three times a day, in the morning before breakfast and with meals. Bring these logs to next appointment.    Continue losartan 50mg daily.    Follow up with our clinic closely for medication adjustment.

## 2024-01-02 NOTE — TELEPHONE ENCOUNTER
Please verify insurance and offer NP/TCM appt. He does have a PCP listed which I believe is a provider at the [facility]

## 2024-01-03 ENCOUNTER — TRANSITIONAL CARE MANAGEMENT (OUTPATIENT)
Dept: FAMILY MEDICINE CLINIC | Facility: CLINIC | Age: 39
End: 2024-01-03

## 2024-01-03 LAB
GAD65 AB SER-ACNC: <5 U/ML (ref 0–5)
PANC ISLET CELL AB TITR SER: NEGATIVE {TITER}

## 2024-01-04 LAB
ATRIAL RATE: 59 BPM
P AXIS: 78 DEGREES
PR INTERVAL: 132 MS
QRS AXIS: 6 DEGREES
QRSD INTERVAL: 88 MS
QT INTERVAL: 454 MS
QTC INTERVAL: 449 MS
T WAVE AXIS: 261 DEGREES
VENTRICULAR RATE: 59 BPM

## 2024-01-07 LAB — INSULIN AB SER-ACNC: <5 UU/ML

## 2024-01-18 ENCOUNTER — OFFICE VISIT (OUTPATIENT)
Dept: INTERNAL MEDICINE CLINIC | Facility: CLINIC | Age: 39
End: 2024-01-18

## 2024-01-18 VITALS
TEMPERATURE: 97.8 F | SYSTOLIC BLOOD PRESSURE: 128 MMHG | BODY MASS INDEX: 35.73 KG/M2 | WEIGHT: 235 LBS | HEART RATE: 69 BPM | DIASTOLIC BLOOD PRESSURE: 76 MMHG | OXYGEN SATURATION: 98 %

## 2024-01-18 DIAGNOSIS — Z59.9 FINANCIAL DIFFICULTIES: ICD-10-CM

## 2024-01-18 DIAGNOSIS — E66.01 CLASS 2 SEVERE OBESITY DUE TO EXCESS CALORIES WITH SERIOUS COMORBIDITY AND BODY MASS INDEX (BMI) OF 35.0 TO 35.9 IN ADULT: ICD-10-CM

## 2024-01-18 DIAGNOSIS — E10.10 DIABETIC KETOACIDOSIS WITHOUT COMA ASSOCIATED WITH TYPE 1 DIABETES MELLITUS (HCC): Primary | ICD-10-CM

## 2024-01-18 DIAGNOSIS — E11.10 DKA (DIABETIC KETOACIDOSIS) (HCC): ICD-10-CM

## 2024-01-18 DIAGNOSIS — I10 HYPERTENSION, UNSPECIFIED TYPE: ICD-10-CM

## 2024-01-18 DIAGNOSIS — E78.49 OTHER HYPERLIPIDEMIA: ICD-10-CM

## 2024-01-18 PROCEDURE — 99204 OFFICE O/P NEW MOD 45 MIN: CPT | Performed by: INTERNAL MEDICINE

## 2024-01-18 RX ORDER — BLOOD SUGAR DIAGNOSTIC
STRIP MISCELLANEOUS
Qty: 100 EACH | Refills: 1 | Status: SHIPPED | OUTPATIENT
Start: 2024-01-18

## 2024-01-18 RX ORDER — GLUCOSAMINE HCL/CHONDROITIN SU 500-400 MG
CAPSULE ORAL
Qty: 100 EACH | Refills: 1 | Status: SHIPPED | OUTPATIENT
Start: 2024-01-18

## 2024-01-18 RX ORDER — LISINOPRIL 5 MG/1
5 TABLET ORAL DAILY
Qty: 90 TABLET | Refills: 0 | Status: SHIPPED | OUTPATIENT
Start: 2024-01-18

## 2024-01-18 RX ORDER — LANCETS 33 GAUGE
EACH MISCELLANEOUS
Qty: 100 EACH | Refills: 1 | Status: SHIPPED | OUTPATIENT
Start: 2024-01-18

## 2024-01-18 RX ORDER — ATORVASTATIN CALCIUM 20 MG/1
20 TABLET, FILM COATED ORAL EVERY EVENING
Qty: 90 TABLET | Refills: 1 | Status: SHIPPED | OUTPATIENT
Start: 2024-01-18 | End: 2024-07-16

## 2024-01-18 SDOH — ECONOMIC STABILITY - INCOME SECURITY: PROBLEM RELATED TO HOUSING AND ECONOMIC CIRCUMSTANCES, UNSPECIFIED: Z59.9

## 2024-01-18 NOTE — PROGRESS NOTES
Assessment/Plan:    Diagnoses and all orders for this visit:    Diabetic ketoacidosis without coma associated with type 1 diabetes mellitus (HCC)  -     Comprehensive metabolic panel; Future  -     HEMOGLOBIN A1C W/ EAG ESTIMATION; Future  -     Albumin / creatinine urine ratio; Future  -     Ambulatory referral to social work care management program; Future    Hypertension, unspecified type  -     Comprehensive metabolic panel; Future  -     HEMOGLOBIN A1C W/ EAG ESTIMATION; Future  -     Albumin / creatinine urine ratio; Future  -     Ambulatory referral to social work care management program; Future    Class 2 severe obesity due to excess calories with serious comorbidity and body mass index (BMI) of 35.0 to 35.9 in adult   -     Comprehensive metabolic panel; Future  -     HEMOGLOBIN A1C W/ EAG ESTIMATION; Future  -     Albumin / creatinine urine ratio; Future  -     Ambulatory referral to social work care management program; Future    Financial difficulties  -     Comprehensive metabolic panel; Future  -     HEMOGLOBIN A1C W/ EAG ESTIMATION; Future  -     Albumin / creatinine urine ratio; Future  -     Ambulatory referral to social work care management program; Future    DKA (diabetic ketoacidosis) (HCC)  -     Comprehensive metabolic panel; Future  -     HEMOGLOBIN A1C W/ EAG ESTIMATION; Future  -     Albumin / creatinine urine ratio; Future  -     Ambulatory referral to social work care management program; Future  -     lisinopril (ZESTRIL) 5 mg tablet; Take 1 tablet (5 mg total) by mouth daily  -     atorvastatin (LIPITOR) 20 mg tablet; Take 1 tablet (20 mg total) by mouth every evening  -     Alcohol Swabs 70 % PADS; May substitute brand based on insurance coverage. Check glucose TID.  -     glucose blood (OneTouch Verio) test strip; May substitute brand based on insurance coverage. Check glucose TID.  -     OneTouch Delica Lancets 33G MISC; May substitute brand based on insurance coverage. Check glucose  TID.    Other hyperlipidemia  -     Comprehensive metabolic panel; Future  -     HEMOGLOBIN A1C W/ EAG ESTIMATION; Future  -     Albumin / creatinine urine ratio; Future  -     Ambulatory referral to social work care management program; Future  -     atorvastatin (LIPITOR) 20 mg tablet; Take 1 tablet (20 mg total) by mouth every evening       Patient presents to [facility] care after recent hospital stay for newly diagnosed type 1 diabetes presenting as a DKA, experienced 1 week of weakness, urinary frequency increased thirst prior to presentation.  Patient denies any PMH except for hypertension, not on any medications currently.  Patient has no insurance, and was discharged on NPH insulin 30 units twice daily and losartan 50 mg daily for hypertension  Insulin antibodies have been negative, HbA1c of 14  No home blood sugars total review, reports fasting blood sugars around 88, preprandial blood sugars around 120, consistent with insulin medication.  Does not report any hypoglycemia.  He stopped losartan 1 week after discharge due to dizziness.  Reports his urinary symptoms have all resolved, denies any subjective complaints today    Type 1 diabetes-continue insulin NPH 30 units twice daily, dietary modification discussed in detail, meal plan given, will recheck labs in 3 months with close follow-up.    Obesity-discussed lifestyle modification.    Hyperlipidemia associated with diabetes-discussed need for statin, patient agreeable  Hypertension-BP stable today, will stop her losartan, initiate a low-dose lisinopril for renal protection, and uptitrate as indicated    patient and spouse verbalized understanding and agreement to the above plan.      Depression Screening and Follow-up Plan: Patient was screened for depression during today's encounter. They screened negative with a PHQ-2 score of 0.        Patient Instructions   Meal Planning with the Plate Method   AMBULATORY CARE:   Meal planning with the plate method   is a way for people with diabetes to plan meals. The plate method can help you eat the right amount of carbohydrates and keep your blood sugar levels under control. Carbohydrates naturally raise your blood sugar level. Your blood sugar level can rise too high if you eat too much carbohydrate at one time. Carbohydrates are found in starches (bread, cereal, starchy vegetables, and beans), fruit, milk, yogurt, and sweets.  How to use the plate method to plan meals:   Draw an imaginary line down the middle of a 9-inch dinner plate.  On one side, draw another line to divide that section in half. Your plate will have 3 sections.    Fill the largest section of your plate with non-starchy vegetables.  These include broccoli, spinach, cucumbers, peppers, cauliflower, and tomatoes.    Add a carbohydrate to 1 of the small sections of your plate.  Examples are pasta, rice, whole-grain bread, tortillas, corn, potatoes, and beans. Your plan may allow a serving of low-fat dairy or fruit for a carbohydrate.    Add meat or another source of protein to the other small section of your plate.  Examples include chicken or turkey without skin, fish, lean beef or pork, low-fat cheese, tofu, or eggs.         Add a low-calorie or calorie-free drink.  Examples include water or unsweetened tea or coffee.       Serving sizes of foods:   Non-starchy vegetables:      ½ cup of cooked vegetables or 1 cup of raw vegetables    ½ cup of vegetable juice    Starches:      1 ounce of whole-wheat bread or 1 small (6 inch) flour or corn tortilla    1 small (4 inch) pancake (about ¼ inch thick)    ¾ cup of dry, unsweetened, whole-grain ready-to-eat cereal or ¼ cup of low-fat granola    ½ cup of cooked cereal or oatmeal    ? cup of rice or pasta    ½ cup of corn, green peas, sweet potatoes, or mashed potatoes    ½ cup of cooked beans and peas (garbanzo, green, kidney, white, split, black-eyed)    Meat and other protein sources:      3 to 4 ounces of any  lean meat, fish, or poultry    ½ cup of tofu or tempeh    1 large egg    1½ ounces (about 2 tablespoons) of nuts or 2 tablespoons of peanut butter    Fruit:      1 small piece of fresh fruit     ½ cup of canned or fresh fruit or unsweetened fruit juice    ¼ cup of dried fruit    Milk and yogurt:      1 cup (8 ounces) of skim or 1% milk    ¾ cup (6 ounces) of plain, non-fat yogurt    Other healthy nutrition tips:   Limit salt and sugar.  Choose and prepare foods and drinks with less salt and added sugars. Use the nutrition information on food labels to help you make healthy choices. The percent daily value listed on the food label tells you whether a food is low or high in certain nutrients. A percent daily value of 5% or less means that the food is low in a nutrient. A percent daily value of 20% or more means that the food is high in a nutrient.         Choose healthy fats.  Choose healthy fats such as polyunsaturated and monounsaturated fats in place of unhealthy fats. Healthy fats are found in vegetable oils such as soybean, corn, canola, olive, and sunflower oil. Unhealthy fats are saturated fats, trans fats, and cholesterol. Unhealthy fats are found in shortening, butter, stick margarine, and animal fat.         Ask your healthcare provider if alcohol is okay for you.  Generally, men 65 or older and women should limit alcohol to 1 drink within 24 hours and 7 within 1 week. Men 21 to 64 years should limit alcohol to 2 drinks a day and 14 within 1 week. Your provider can tell you how many drinks are okay for you within 24 hours or within 1 week. A drink of alcohol is 12 ounces of beer, 5 ounces of wine, or 1½ ounces of liquor. Always have food when you drink alcohol. Your blood sugar may fall to a low level if you drink when your stomach is empty.    © Copyright [employer] 2023 Information is for End User's use only and may not be sold, redistributed or otherwise used for commercial purposes.  The above information  is an  only. It is not intended as medical advice for individual conditions or treatments. Talk to your doctor, nurse or pharmacist before following any medical regimen to see if it is safe and effective for you.  Meal Planning with Diabetes Exchanges   AMBULATORY CARE:   Diabetes exchanges  are servings of food that contain similar amounts of carbohydrate, fat, protein, and calories within a food group. The exchanges can be used to develop a healthy meal plan that helps to keep your blood sugar within the recommended levels. A meal plan with the right amount of carbohydrates is especially important. Your blood sugar naturally rises after you eat carbohydrates. Too many carbohydrates in 1 meal or snack can raise your blood sugar level. Carbohydrates are found in starches, fruit, milk, yogurt, and sweets.  Call your doctor or diabetes care provider if:   You have high blood sugar levels during a certain time of day, or almost all of the time.    You often have low blood sugar levels.    You have questions or concerns about your condition or care.    Create a meal plan with exchanges:  A dietitian will work with you to develop a healthy meal plan that is right for you. This meal plan will include the amount of exchanges you can have from each food group throughout the day. Follow your meal plan by keeping track of the amount of exchanges you eat for each meal and snack. Your meal plan will be based on your age, weight, blood sugar levels, medicine, and activity level.  Starch food group exchanges:  Each exchange below contains about 15 grams of carbohydrate , 3 grams of protein, 1 gram of fat, and 80 calories.  1 ounce of white, whole wheat or rye bread (1 slice)    1 ounce of bagel (about ¼ of a bagel)    1 6-inch flour or corn tortilla or 1 4-inch pancake (about ¼ inch thick)    ? cup of cooked pasta or rice    ¾ cup of dry, ready-to-eat cereal with no sugar added    ½ cup of cooked cereal, such as  oatmeal    3 dariana cracker squares or 8 animal crackers    6 saltine-type crackers    3 cups of popcorn or ¾ ounce of pretzels    Starchy vegetables and cooked legumes:      ½ cup of corn, green peas, sweet potatoes, or mashed potatoes    ¼ of a large baked potato    1 cup of acorn, butternut squash, or pumpkin    ½ cup of beans, lentils, or peas (such as green, kidney, or black-eyed)    ? cup of lima beans    Fruit group exchanges:  Each exchange contains about 15 grams of carbohydrate  and 60 calories.  1 small (4 ounce) apple, banana orange, or nectarine    ½ cup of canned or fresh fruit    ½ cup (4 ounces) of unsweetened fruit juice    2 tablespoons of dried fruit    Milk group exchanges:  Each exchange contains about 12 grams of carbohydrate  and 8 grams of protein. The amount of fat and calories in each serving depends on the type of milk (such as whole, low-fat, or fat-free).  1 cup fat-free or low-fat milk    ¾ cup of plain, nonfat yogurt    1 cup fat-free, flavored yogurt with artificial (no calorie) sweetener    Non-starchy vegetable group exchanges:  Each exchange contains about 5 grams of carbohydrate , 2 grams of protein, and 25 calories. Examples include beets, broccoli, cabbage, carrots, cauliflower, cucumber, mushrooms, tomatoes, and zucchini.  ½ cup of cooked vegetables or 1 cup of raw vegetables    ½ cup of vegetable juice    Meat and meat substitute group exchanges:  Each exchange of a lean meat  listed below contains about 7 grams of protein, 0 to 3 grams of fat, and 45 calories. The meat and meat substitutes food group does not contain any carbohydrates. Medium and high-fat meats have more calories.  1 ounce of chicken or turkey without skin, or 1 ounce of fish (not breaded or fried)    1 ounce of lean beef, pork, or lamb    1-inch cube or 1 ounce of low-fat cheese    2 egg whites or ¼ cup of egg substitute    ½ cup of tofu    Sweets, desserts, and other carbohydrate group exchanges:   Sweets  and other desserts:  Each exchange has about 15 grams of carbohydrate .    1 ounce of kathya food cake or 2-inch square cake (unfrosted)    2 small cookies    ½ cup of sugar-free, fat-free ice cream    1 tablespoon of syrup, jam, jelly, table sugar, or honey    Combination foods:     1 cup of an entrée, such as lasagna, spaghetti with meatballs, macaroni and cheese, and chili with beans (each serving counts as 2 carbohydrate exchanges )    1 cup of tomato or vegetable beef soup (each serving counts as 1 carbohydrate exchange )    Fat group exchanges:  Each exchange contains 5 grams of fat and 45 calories.  1 teaspoon of oil (such as canola, olive, or corn oil)    6 almonds or cashews, 10 peanuts, or 4 pecan halves    2 tablespoons of avocado    ½ tablespoon of peanut butter    1 teaspoon of regular margarine or 2 teaspoons of low-fat margarine    1 teaspoon of regular butter or 1 tablespoon of low-fat butter    1 teaspoon of regular mayonnaise or 1 tablespoon of low-fat mayonnaise    1 tablespoon of regular salad dressing or 2 tablespoons of low-fat salad dressing    Free foods:  The foods on this list are called free foods because they have very few calories. Free foods usually do not increase your blood sugar if you limit them.  1 tablespoon of catsup or taco sauce    ¼ cup of salsa    2 tablespoons of sugar-free syrup or 2 teaspoons of light jam or jelly    1 tablespoon of fat-free salad dressing    4 tablespoons of fat-free margarine or fat-free mayonnaise    Sugar-free drinks: diet soda, sugar-free drink mixes, or mineral water    Low-sodium bouillon or fat-free broth    Mustard    Seasonings such as spices, herbs, and garlic    Sugar-free gelatin without added fruit    Other healthy nutrition guidelines:   Limit drinks with sugar substitutes.  Your dietitian or healthcare provider will encourage you to drink water. Water helps your kidneys to function properly. Ask how much water you should drink every  day.    Eat more fiber.  Choose foods that are good sources of fiber, such as fruits, vegetables, and whole grains. Cereals that contain 5 or more grams of fiber per serving are good sources of fiber. Legumes such as garbanzo, green beans, kidney beans, and lentils are also good sources.         Limit fat.  Ask your dietitian or healthcare provider how much fat you should eat each day. Choose foods low in fat, saturated fat, trans fat, and cholesterol. Examples include turkey or chicken without the skin, fish, lean cuts of meat, and beans. Low-fat dairy foods, such as low-fat or fat-free milk and low-fat yogurt are also good choices. Omega-3 fatty acids are healthy fats that are found in canola oil, soybean oil and fatty fish. [place], albacore tuna, and sardines are good sources of omega 3 fatty acids. Eat 2 servings of these types of fish each week. Do not eat fried fish.         Limit sugar.  Sugar and sweets must be counted toward the carbohydrate exchanges that you can have within your meal plan. Limit sugar and sweets because they are usually also high in calories and fat. Eat smaller portions of sweets by sharing a dessert or asking for a child-size portion at a restaurant.    Limit sodium  (salt) to about 2,300 mg per day. You may need to eat even less sodium if you have certain medical conditions. Foods high in sodium include soy sauce, potato chips, and soup.         Limit alcohol.  Ask your healthcare provider if it is safe for you to drink alcohol. If alcohol is safe for you to have, eat a meal when you drink alcohol. If you drink alcohol on an empty stomach, your blood sugar may drop to a low level. Women 21 years or older and men 65 years or older should limit alcohol to 1 drink a day. Men aged 21 to 64 years should limit alcohol to 2 drinks a day. A drink of alcohol is 5 ounces of wine, 12 ounces of beer, or 1½ ounces of liquor.    Other ways to manage diabetes:   Control your blood sugar level.  Test  your blood sugar level regularly and keep a record of the results. Ask your healthcare provider when and how often to test your blood sugar. You may need to check your blood sugar level at least 3 times each day.    Talk to your healthcare provider about your weight.  Ask if you need to lose weight, and how much you need to lose. If you are overweight, you may need to make other changes to lose weight. Ask your healthcare provider to help you create a weight loss program.    Get regular physical activity.  Physical activity can help decrease your blood sugar level. It can also help to decrease your risk for heart disease and help you lose weight. Adults should have moderate intensity physical activity for at least 150 minutes every week. Spread the amount of activity over at least 3 days a week. Do not skip more than 2 days in a row. Children should get at least 60 minutes of moderate physical activity on most days of the week. Examples of moderate physical activity include brisk walking, running, and swimming. Do not sit for longer than 30 minutes. Work with your healthcare provider to create a plan for physical activity.       © Copyright [employer] 2023 Information is for End User's use only and may not be sold, redistributed or otherwise used for commercial purposes.  The above information is an  only. It is not intended as medical advice for individual conditions or treatments. Talk to your doctor, nurse or pharmacist before following any medical regimen to see if it is safe and effective for you.      Subjective:      Patient ID: Benitez White is a 39 y.o. male    HPI      Current Outpatient Medications:     Alcohol Swabs 70 % PADS, May substitute brand based on insurance coverage. Check glucose TID., Disp: 100 each, Rfl: 1    atorvastatin (LIPITOR) 20 mg tablet, Take 1 tablet (20 mg total) by mouth every evening, Disp: 90 tablet, Rfl: 1    Blood Glucose Monitoring Suppl (OneTouch Verio Reflect)  "w/Device KIT, May substitute brand based on insurance coverage. Check glucose TID., Disp: 1 kit, Rfl: 0    glucose blood (OneTouch Verio) test strip, May substitute brand based on insurance coverage. Check glucose TID., Disp: 100 each, Rfl: 1    insulin NPH (NovoLIN N) 100 Units/mL subcutaneous injection, Inject 30 Units under the skin 2 (two) times a day before meals, Disp: 10 mL, Rfl: 0    Insulin Syringe-Needle U-100 (BD Insulin Syringe U/F) 31G X 5/16\" 0.3 ML MISC, For use with insulin. Pharmacy may dispense brand covered by insurance., Disp: 100 each, Rfl: 0    lisinopril (ZESTRIL) 5 mg tablet, Take 1 tablet (5 mg total) by mouth daily, Disp: 90 tablet, Rfl: 0    OneTouch Delica Lancets 33G MISC, May substitute brand based on insurance coverage. Check glucose TID., Disp: 100 each, Rfl: 1     History reviewed. No pertinent past medical history.      History reviewed. No pertinent surgical history.      Allergies   Allergen Reactions    Nuts - Food Allergy      Cashews       Recent Results (from the past 1008 hour(s))   Fingerstick Glucose (POCT)    Collection Time: 12/30/23 10:46 AM   Result Value Ref Range    POC Glucose >500 (HH) 65 - 140 mg/dl   ECG 12 lead    Collection Time: 12/30/23 11:09 AM   Result Value Ref Range    Ventricular Rate 71 BPM    Atrial Rate 71 BPM    AK Interval 138 ms    QRSD Interval 102 ms    QT Interval 418 ms    QTC Interval 454 ms    P Axis 33 degrees    QRS Axis -5 degrees    T Wave Axis 152 degrees   UA w Reflex to Microscopic w Reflex to Culture    Collection Time: 12/30/23 11:10 AM    Specimen: Urine, Clean Catch   Result Value Ref Range    Color, UA Light Yellow     Clarity, UA Clear     Specific Gravity, UA 1.034 (H) 1.003 - 1.030    pH, UA 5.0 4.5, 5.0, 5.5, 6.0, 6.5, 7.0, 7.5, 8.0    Leukocytes, UA (A) Negative     Elevated glucose may cause decreased leukocyte values. See urine microscopic for UWBC result    Nitrite, UA Negative Negative    Protein, UA Trace (A) Negative " mg/dl    Glucose, UA >=1000 (1%) (A) Negative mg/dl    Ketones,  (3+) (A) Negative mg/dl    Urobilinogen, UA <2.0 <2.0 mg/dl mg/dl    Bilirubin, UA Negative Negative    Occult Blood, UA Trace (A) Negative   Urine Microscopic    Collection Time: 12/30/23 11:10 AM   Result Value Ref Range    RBC, UA None Seen None Seen, 1-2 /hpf    WBC, UA 1-2 None Seen, 1-2 /hpf    Epithelial Cells Occasional None Seen, Occasional /hpf    Bacteria, UA Occasional None Seen, Occasional /hpf    MUCUS THREADS Occasional (A) None Seen    Hyaline Casts, UA 3-5 (A) None Seen /lpf    Granular Casts, UA 10-25 (A) (none)   CBC and differential    Collection Time: 12/30/23 11:37 AM   Result Value Ref Range    WBC 11.39 (H) 4.31 - 10.16 Thousand/uL    RBC 5.15 3.88 - 5.62 Million/uL    Hemoglobin 14.5 12.0 - 17.0 g/dL    Hematocrit 44.6 36.5 - 49.3 %    MCV 87 82 - 98 fL    MCH 28.2 26.8 - 34.3 pg    MCHC 32.5 31.4 - 37.4 g/dL    RDW 13.8 11.6 - 15.1 %    Platelets      nRBC 0 /100 WBCs    Neutrophils Relative 76 (H) 43 - 75 %    Immat GRANS % 0 0 - 2 %    Lymphocytes Relative 18 14 - 44 %    Monocytes Relative 5 4 - 12 %    Eosinophils Relative 0 0 - 6 %    Basophils Relative 1 0 - 1 %    Neutrophils Absolute 8.72 (H) 1.85 - 7.62 Thousands/µL    Immature Grans Absolute 0.05 0.00 - 0.20 Thousand/uL    Lymphocytes Absolute 1.99 0.60 - 4.47 Thousands/µL    Monocytes Absolute 0.55 0.17 - 1.22 Thousand/µL    Eosinophils Absolute 0.01 0.00 - 0.61 Thousand/µL    Basophils Absolute 0.07 0.00 - 0.10 Thousands/µL   Beta Hydroxybutyrate    Collection Time: 12/30/23 11:48 AM   Result Value Ref Range    BETA-HYDROXYBUTYRATE 5.8 (H) <0.6 mmol/L   Comprehensive metabolic panel    Collection Time: 12/30/23 11:48 AM   Result Value Ref Range    Sodium 134 (L) 135 - 147 mmol/L    Potassium 4.8 3.5 - 5.3 mmol/L    Chloride 92 (L) 96 - 108 mmol/L    CO2 19 (L) 21 - 32 mmol/L    ANION GAP 23 mmol/L    BUN 22 5 - 25 mg/dL    Creatinine 1.16 0.60 - 1.30 mg/dL     Glucose 579 (HH) 65 - 140 mg/dL    Calcium 10.2 8.4 - 10.2 mg/dL    AST 16 13 - 39 U/L    ALT 28 7 - 52 U/L    Alkaline Phosphatase 145 (H) 34 - 104 U/L    Total Protein 7.8 6.4 - 8.4 g/dL    Albumin 4.4 3.5 - 5.0 g/dL    Total Bilirubin 1.02 (H) 0.20 - 1.00 mg/dL    eGFR 78 ml/min/1.73sq m   Magnesium    Collection Time: 12/30/23 11:48 AM   Result Value Ref Range    Magnesium 2.1 1.9 - 2.7 mg/dL   Phosphorus    Collection Time: 12/30/23 11:48 AM   Result Value Ref Range    Phosphorus 6.6 (H) 2.7 - 4.5 mg/dL   Blood gas, venous    Collection Time: 12/30/23 11:48 AM   Result Value Ref Range    pH, Po 7.348 7.300 - 7.400    pCO2, Po 33.7 (L) 42.0 - 50.0 mm Hg    pO2, Po 67.4 (H) 35.0 - 45.0 mm Hg    HCO3, Po 18.1 (L) 24 - 30 mmol/L    Base Excess, Po -6.4 mmol/L    O2 Content, Po 19.8 ml/dL    O2 HGB, VENOUS 89.3 (H) 60.0 - 80.0 %   Fingerstick Glucose (POCT)    Collection Time: 12/30/23  1:11 PM   Result Value Ref Range    POC Glucose 468 (H) 65 - 140 mg/dl   COVID/FLU/RSV    Collection Time: 12/30/23  1:49 PM    Specimen: Nose; Nares   Result Value Ref Range    SARS-CoV-2 Negative Negative    INFLUENZA A PCR Negative Negative    INFLUENZA B PCR Negative Negative    RSV PCR Positive (A) Negative   Hemoglobin A1C    Collection Time: 12/30/23  2:33 PM   Result Value Ref Range    Hemoglobin A1C 14.4 (H) Normal 4.0-5.6%; PreDiabetic 5.7-6.4%; Diabetic >=6.5%; Glycemic control for adults with diabetes <7.0% %     mg/dl   Fingerstick Glucose (POCT)    Collection Time: 12/30/23  2:37 PM   Result Value Ref Range    POC Glucose 364 (H) 65 - 140 mg/dl   Fingerstick Glucose (POCT)    Collection Time: 12/30/23  3:32 PM   Result Value Ref Range    POC Glucose 261 (H) 65 - 140 mg/dl   Fingerstick Glucose (POCT)    Collection Time: 12/30/23  4:33 PM   Result Value Ref Range    POC Glucose 262 (H) 65 - 140 mg/dl   Basic metabolic panel    Collection Time: 12/30/23  4:44 PM   Result Value Ref Range    Sodium 135 135 -  147 mmol/L    Potassium 3.7 3.5 - 5.3 mmol/L    Chloride 101 96 - 108 mmol/L    CO2 19 (L) 21 - 32 mmol/L    ANION GAP 15 mmol/L    BUN 18 5 - 25 mg/dL    Creatinine 0.85 0.60 - 1.30 mg/dL    Glucose 282 (H) 65 - 140 mg/dL    Calcium 8.4 8.4 - 10.2 mg/dL    eGFR 109 ml/min/1.73sq m   Magnesium    Collection Time: 12/30/23  4:44 PM   Result Value Ref Range    Magnesium 1.9 1.9 - 2.7 mg/dL   Phosphorus    Collection Time: 12/30/23  4:44 PM   Result Value Ref Range    Phosphorus 3.6 2.7 - 4.5 mg/dL   Fingerstick Glucose (POCT)    Collection Time: 12/30/23  5:37 PM   Result Value Ref Range    POC Glucose 210 (H) 65 - 140 mg/dl   Fingerstick Glucose (POCT)    Collection Time: 12/30/23  6:39 PM   Result Value Ref Range    POC Glucose 185 (H) 65 - 140 mg/dl   Fingerstick Glucose (POCT)    Collection Time: 12/30/23  7:32 PM   Result Value Ref Range    POC Glucose 173 (H) 65 - 140 mg/dl   Basic metabolic panel    Collection Time: 12/30/23  8:49 PM   Result Value Ref Range    Sodium 137 135 - 147 mmol/L    Potassium 3.5 3.5 - 5.3 mmol/L    Chloride 104 96 - 108 mmol/L    CO2 26 21 - 32 mmol/L    ANION GAP 7 mmol/L    BUN 15 5 - 25 mg/dL    Creatinine 0.77 0.60 - 1.30 mg/dL    Glucose 164 (H) 65 - 140 mg/dL    Calcium 7.9 (L) 8.4 - 10.2 mg/dL    eGFR 114 ml/min/1.73sq m   Magnesium    Collection Time: 12/30/23  8:49 PM   Result Value Ref Range    Magnesium 1.9 1.9 - 2.7 mg/dL   Phosphorus    Collection Time: 12/30/23  8:49 PM   Result Value Ref Range    Phosphorus 2.7 2.7 - 4.5 mg/dL   Fingerstick Glucose (POCT)    Collection Time: 12/30/23  9:06 PM   Result Value Ref Range    POC Glucose 159 (H) 65 - 140 mg/dl   Fingerstick Glucose (POCT)    Collection Time: 12/30/23 10:08 PM   Result Value Ref Range    POC Glucose 183 (H) 65 - 140 mg/dl   Fingerstick Glucose (POCT)    Collection Time: 12/30/23 11:00 PM   Result Value Ref Range    POC Glucose 174 (H) 65 - 140 mg/dl   Basic metabolic panel    Collection Time: 12/31/23 12:44 AM  "  Result Value Ref Range    Sodium 136 135 - 147 mmol/L    Potassium 3.7 3.5 - 5.3 mmol/L    Chloride 103 96 - 108 mmol/L    CO2 25 21 - 32 mmol/L    ANION GAP 8 mmol/L    BUN 13 5 - 25 mg/dL    Creatinine 0.77 0.60 - 1.30 mg/dL    Glucose 218 (H) 65 - 140 mg/dL    Calcium 8.3 (L) 8.4 - 10.2 mg/dL    eGFR 114 ml/min/1.73sq m   Magnesium    Collection Time: 12/31/23 12:44 AM   Result Value Ref Range    Magnesium 1.8 (L) 1.9 - 2.7 mg/dL   Phosphorus    Collection Time: 12/31/23 12:44 AM   Result Value Ref Range    Phosphorus 2.8 2.7 - 4.5 mg/dL   Fingerstick Glucose (POCT)    Collection Time: 12/31/23 12:46 AM   Result Value Ref Range    POC Glucose 227 (H) 65 - 140 mg/dl   ECG 12 lead    Collection Time: 12/31/23 12:52 AM   Result Value Ref Range    Ventricular Rate 59 BPM    Atrial Rate 59 BPM    CA Interval 132 ms    QRSD Interval 88 ms    QT Interval 454 ms    QTC Interval 449 ms    P Axis 78 degrees    QRS Axis 6 degrees    T Wave Axis 261 degrees   Fingerstick Glucose (POCT)    Collection Time: 12/31/23  2:03 AM   Result Value Ref Range    POC Glucose 137 65 - 140 mg/dl   ECG 12 lead    Collection Time: 12/31/23  2:06 AM   Result Value Ref Range    Ventricular Rate 61 BPM    Atrial Rate 61 BPM    CA Interval 144 ms    QRSD Interval 92 ms    QT Interval 444 ms    QTC Interval 446 ms    P Axis 45 degrees    QRS Axis 12 degrees    T Wave [place] 264 degrees   HS Troponin 0hr (reflex protocol)    Collection Time: 12/31/23  2:33 AM   Result Value Ref Range    hs TnI 0hr 53 (H) \"Refer to ACS Flowchart\"- see link ng/L   Fingerstick Glucose (POCT)    Collection Time: 12/31/23  4:35 AM   Result Value Ref Range    POC Glucose 127 65 - 140 mg/dl   HS Troponin I 2hr    Collection Time: 12/31/23  4:37 AM   Result Value Ref Range    hs TnI 2hr 52 (H) \"Refer to ACS Flowchart\"- see link ng/L    Delta 2hr hsTnI -1 <20 ng/L   CBC and Platelet    Collection Time: 12/31/23  4:37 AM   Result Value Ref Range    WBC 10.25 (H) 4.31 - " "10.16 Thousand/uL    RBC 3.99 3.88 - 5.62 Million/uL    Hemoglobin 11.5 (L) 12.0 - 17.0 g/dL    Hematocrit 34.8 (L) 36.5 - 49.3 %    MCV 87 82 - 98 fL    MCH 28.8 26.8 - 34.3 pg    MCHC 33.0 31.4 - 37.4 g/dL    RDW 13.8 11.6 - 15.1 %    Platelets 210 149 - 390 Thousands/uL    MPV 13.3 (H) 8.9 - 12.7 fL   Phosphorus    Collection Time: 12/31/23  4:37 AM   Result Value Ref Range    Phosphorus 2.7 2.7 - 4.5 mg/dL   Magnesium    Collection Time: 12/31/23  4:37 AM   Result Value Ref Range    Magnesium 1.9 1.9 - 2.7 mg/dL   Comprehensive metabolic panel    Collection Time: 12/31/23  4:37 AM   Result Value Ref Range    Sodium 137 135 - 147 mmol/L    Potassium 3.6 3.5 - 5.3 mmol/L    Chloride 104 96 - 108 mmol/L    CO2 26 21 - 32 mmol/L    ANION GAP 7 mmol/L    BUN 13 5 - 25 mg/dL    Creatinine 0.72 0.60 - 1.30 mg/dL    Glucose 116 65 - 140 mg/dL    Calcium 8.5 8.4 - 10.2 mg/dL    Corrected Calcium 9.1 8.3 - 10.1 mg/dL    AST 19 13 - 39 U/L    ALT 20 7 - 52 U/L    Alkaline Phosphatase 85 34 - 104 U/L    Total Protein 5.5 (L) 6.4 - 8.4 g/dL    Albumin 3.2 (L) 3.5 - 5.0 g/dL    Total Bilirubin 1.11 (H) 0.20 - 1.00 mg/dL    eGFR 117 ml/min/1.73sq m   TSH, 3rd generation with Free T4 reflex    Collection Time: 12/31/23  4:37 AM   Result Value Ref Range    TSH 3RD GENERATON 1.479 0.450 - 4.500 uIU/mL   Lipid panel    Collection Time: 12/31/23  4:37 AM   Result Value Ref Range    Cholesterol 102 See Comment mg/dL    Triglycerides 89 See Comment mg/dL    HDL, Direct 26 (L) >=40 mg/dL    LDL Calculated 58 0 - 100 mg/dL    Non-HDL-Chol (CHOL-HDL) 76 mg/dl   Fingerstick Glucose (POCT)    Collection Time: 12/31/23  6:33 AM   Result Value Ref Range    POC Glucose 101 65 - 140 mg/dl   HS Troponin I 4hr    Collection Time: 12/31/23  6:36 AM   Result Value Ref Range    hs TnI 4hr 48 \"Refer to ACS Flowchart\"- see link ng/L    Delta 4hr hsTnI -5 <20 ng/L   Fingerstick Glucose (POCT)    Collection Time: 12/31/23  8:40 AM   Result Value Ref " Range    POC Glucose 190 (H) 65 - 140 mg/dl   Fingerstick Glucose (POCT)    Collection Time: 12/31/23 10:00 AM   Result Value Ref Range    POC Glucose 202 (H) 65 - 140 mg/dl   Echo complete w/ contrast if indicated    Collection Time: 12/31/23 11:27 AM   Result Value Ref Range    RAA A4C 15.5 cm2    LA Volume Index (BP) 22.6 mL/m2    MV Peak A Lewis 0.49 m/s    MV stenosis pressure 1/2 time 58 ms    MV Peak E Lewis 54 cm/s    E wave deceleration time 201 ms    E/A ratio 1.10     MV valve area p 1/2 method 3.79     RVID d 3.3 cm    A4C EF 62 %    Left ventricular stroke volume (2D) 55.00 mL    IVSd 1.30 cm    Tricuspid annular plane systolic excursion 2.40 cm    Ao root 3.30 cm    LVPWd 1.30 cm    LA size 3.6 cm    Asc Ao 2.9 cm    LA volume (BP) 49 mL    FS 28 28 - 44    LVIDS 3.30 cm    IVS 1.3 cm    LVIDd 4.60 cm    LA length (A2C) 5.10 cm    LEFT VENTRICLE SYSTOLIC VOLUME (MOD BIPLANE) 2D 44 mL    LV DIASTOLIC VOLUME (MOD BIPLANE) 2D 99 mL    Left Atrium Area-systolic Four Chamber 17.1 cm2    Left Atrium Area-systolic Apical Two Chamber 18 cm2    MV E' Tissue Velocity Septal 6 cm/s    LVSV, 2D 55 mL    LV EF 62    Fingerstick Glucose (POCT)    Collection Time: 12/31/23 11:58 AM   Result Value Ref Range    POC Glucose 138 65 - 140 mg/dl   Fingerstick Glucose (POCT)    Collection Time: 12/31/23  4:02 PM   Result Value Ref Range    POC Glucose 209 (H) 65 - 140 mg/dl   Fingerstick Glucose (POCT)    Collection Time: 12/31/23  9:21 PM   Result Value Ref Range    POC Glucose 218 (H) 65 - 140 mg/dl   Fingerstick Glucose (POCT)    Collection Time: 01/01/24  5:54 AM   Result Value Ref Range    POC Glucose 320 (H) 65 - 140 mg/dl   CBC and Platelet    Collection Time: 01/01/24 10:38 AM   Result Value Ref Range    WBC 8.38 4.31 - 10.16 Thousand/uL    RBC 4.49 3.88 - 5.62 Million/uL    Hemoglobin 12.8 12.0 - 17.0 g/dL    Hematocrit 37.9 36.5 - 49.3 %    MCV 84 82 - 98 fL    MCH 28.5 26.8 - 34.3 pg    MCHC 33.8 31.4 - 37.4 g/dL     RDW 13.7 11.6 - 15.1 %    Platelets 212 149 - 390 Thousands/uL    MPV 13.2 (H) 8.9 - 12.7 fL   Magnesium    Collection Time: 01/01/24 10:38 AM   Result Value Ref Range    Magnesium 1.8 (L) 1.9 - 2.7 mg/dL   Fingerstick Glucose (POCT)    Collection Time: 01/01/24 10:53 AM   Result Value Ref Range    POC Glucose 295 (H) 65 - 140 mg/dl   Comprehensive metabolic panel    Collection Time: 01/01/24 11:22 AM   Result Value Ref Range    Sodium 133 (L) 135 - 147 mmol/L    Potassium 3.3 (L) 3.5 - 5.3 mmol/L    Chloride 98 96 - 108 mmol/L    CO2 27 21 - 32 mmol/L    ANION GAP 8 mmol/L    BUN 11 5 - 25 mg/dL    Creatinine 0.73 0.60 - 1.30 mg/dL    Glucose 312 (H) 65 - 140 mg/dL    Calcium 8.7 8.4 - 10.2 mg/dL    AST 19 13 - 39 U/L    ALT 22 7 - 52 U/L    Alkaline Phosphatase 91 34 - 104 U/L    Total Protein 6.0 (L) 6.4 - 8.4 g/dL    Albumin 3.5 3.5 - 5.0 g/dL    Total Bilirubin 0.92 0.20 - 1.00 mg/dL    eGFR 116 ml/min/1.73sq m   Fingerstick Glucose (POCT)    Collection Time: 01/01/24  4:29 PM   Result Value Ref Range    POC Glucose 166 (H) 65 - 140 mg/dl   Fingerstick Glucose (POCT)    Collection Time: 01/01/24  9:07 PM   Result Value Ref Range    POC Glucose 190 (H) 65 - 140 mg/dl   Fingerstick Glucose (POCT)    Collection Time: 01/02/24  7:32 AM   Result Value Ref Range    POC Glucose 186 (H) 65 - 140 mg/dl   CBC and differential    Collection Time: 01/02/24  9:05 AM   Result Value Ref Range    WBC 7.82 4.31 - 10.16 Thousand/uL    RBC 4.53 3.88 - 5.62 Million/uL    Hemoglobin 13.0 12.0 - 17.0 g/dL    Hematocrit 38.7 36.5 - 49.3 %    MCV 85 82 - 98 fL    MCH 28.7 26.8 - 34.3 pg    MCHC 33.6 31.4 - 37.4 g/dL    RDW 14.0 11.6 - 15.1 %    MPV 13.0 (H) 8.9 - 12.7 fL    Platelets 225 149 - 390 Thousands/uL    nRBC 0 /100 WBCs    Neutrophils Relative 51 43 - 75 %    Immat GRANS % 0 0 - 2 %    Lymphocytes Relative 37 14 - 44 %    Monocytes Relative 10 4 - 12 %    Eosinophils Relative 1 0 - 6 %    Basophils Relative 1 0 - 1 %     Neutrophils Absolute 4.03 1.85 - 7.62 Thousands/µL    Immature Grans Absolute 0.03 0.00 - 0.20 Thousand/uL    Lymphocytes Absolute 2.87 0.60 - 4.47 Thousands/µL    Monocytes Absolute 0.77 0.17 - 1.22 Thousand/µL    Eosinophils Absolute 0.07 0.00 - 0.61 Thousand/µL    Basophils Absolute 0.05 0.00 - 0.10 Thousands/µL   Basic metabolic panel    Collection Time: 01/02/24  9:05 AM   Result Value Ref Range    Sodium 139 135 - 147 mmol/L    Potassium 3.3 (L) 3.5 - 5.3 mmol/L    Chloride 104 96 - 108 mmol/L    CO2 26 21 - 32 mmol/L    ANION GAP 9 mmol/L    BUN 12 5 - 25 mg/dL    Creatinine 0.78 0.60 - 1.30 mg/dL    Glucose 213 (H) 65 - 140 mg/dL    Calcium 8.9 8.4 - 10.2 mg/dL    eGFR 113 ml/min/1.73sq m   Fingerstick Glucose (POCT)    Collection Time: 01/02/24 10:32 AM   Result Value Ref Range    POC Glucose 289 (H) 65 - 140 mg/dl   Anti-islet cell antibody    Collection Time: 01/03/24  3:09 PM   Result Value Ref Range    Islet Cell Ab Negative Neg:<1:1   Glutamic acid decarboxylase    Collection Time: 01/03/24  3:09 PM   Result Value Ref Range    Glutaminc Acid Decarboxylase 65 Ab <5.0 0.0 - 5.0 U/mL   Insulin antibody    Collection Time: 01/07/24  5:05 PM   Result Value Ref Range    Insulin AutoAb <5.0 uU/mL       The following portions of the patient's history were reviewed and updated as appropriate: allergies, current medications, past family history, past medical history, past social history, past surgical history and problem list.     Review of Systems   Constitutional:  Negative for activity change, appetite change, chills, diaphoresis, fatigue, fever and unexpected weight change.   HENT:  Negative for congestion and sore throat.    Respiratory:  Negative for apnea, cough, choking, chest tightness, shortness of breath, wheezing and stridor.    Cardiovascular:  Negative for chest pain, palpitations and leg swelling.   Gastrointestinal:  Negative for abdominal distention, abdominal pain, blood in stool, constipation,  nausea and rectal pain.   Endocrine: Negative for cold intolerance, heat intolerance, polydipsia, polyphagia and polyuria.   Genitourinary:  Negative for dysuria, flank pain, frequency and urgency.   Musculoskeletal:  Negative for arthralgias, back pain, gait problem, joint swelling and myalgias.   Skin:  Negative for color change, pallor and rash.   Neurological:  Negative for headaches.         Objective:      Vitals:    01/18/24 1217   BP: 128/76   Pulse: 69   Temp: 97.8 °F (36.6 °C)   SpO2: 98%          Physical Exam  Vitals reviewed.   Constitutional:       General: He is not in acute distress.     Appearance: Normal appearance. He is not ill-appearing, toxic-appearing or diaphoretic.   HENT:      Mouth/Throat:      Mouth: Mucous membranes are moist.   Cardiovascular:      Rate and Rhythm: Normal rate and regular rhythm.      Pulses: Normal pulses.      Heart sounds: Normal heart sounds. No murmur heard.     No friction rub. No gallop.   Pulmonary:      Effort: Pulmonary effort is normal. No respiratory distress.      Breath sounds: Normal breath sounds. No stridor. No wheezing, rhonchi or rales.   Chest:      Chest wall: No tenderness.   Musculoskeletal:      Right lower leg: No edema.      Left lower leg: No edema.   Skin:     General: Skin is warm and dry.      Findings: No lesion or rash.   Neurological:      Mental Status: He is alert.

## 2024-01-18 NOTE — PATIENT INSTRUCTIONS
Meal Planning with the Plate Method   AMBULATORY CARE:   Meal planning with the plate method  is a way for people with diabetes to plan meals. The plate method can help you eat the right amount of carbohydrates and keep your blood sugar levels under control. Carbohydrates naturally raise your blood sugar level. Your blood sugar level can rise too high if you eat too much carbohydrate at one time. Carbohydrates are found in starches (bread, cereal, starchy vegetables, and beans), fruit, milk, yogurt, and sweets.  How to use the plate method to plan meals:   Draw an imaginary line down the middle of a 9-inch dinner plate.  On one side, draw another line to divide that section in half. Your plate will have 3 sections.    Fill the largest section of your plate with non-starchy vegetables.  These include broccoli, spinach, cucumbers, peppers, cauliflower, and tomatoes.    Add a carbohydrate to 1 of the small sections of your plate.  Examples are pasta, rice, whole-grain bread, tortillas, corn, potatoes, and beans. Your plan may allow a serving of low-fat dairy or fruit for a carbohydrate.    Add meat or another source of protein to the other small section of your plate.  Examples include chicken or turkey without skin, fish, lean beef or pork, low-fat cheese, tofu, or eggs.         Add a low-calorie or calorie-free drink.  Examples include water or unsweetened tea or coffee.       Serving sizes of foods:   Non-starchy vegetables:      ½ cup of cooked vegetables or 1 cup of raw vegetables    ½ cup of vegetable juice    Starches:      1 ounce of whole-wheat bread or 1 small (6 inch) flour or corn tortilla    1 small (4 inch) pancake (about ¼ inch thick)    ¾ cup of dry, unsweetened, whole-grain ready-to-eat cereal or ¼ cup of low-fat granola    ½ cup of cooked cereal or oatmeal    ? cup of rice or pasta    ½ cup of corn, green peas, sweet potatoes, or mashed potatoes    ½ cup of cooked beans and peas (norma galvan,  kidney, white, split, black-eyed)    Meat and other protein sources:      3 to 4 ounces of any lean meat, fish, or poultry    ½ cup of tofu or tempeh    1 large egg    1½ ounces (about 2 tablespoons) of nuts or 2 tablespoons of peanut butter    Fruit:      1 small piece of fresh fruit     ½ cup of canned or fresh fruit or unsweetened fruit juice    ¼ cup of dried fruit    Milk and yogurt:      1 cup (8 ounces) of skim or 1% milk    ¾ cup (6 ounces) of plain, non-fat yogurt    Other healthy nutrition tips:   Limit salt and sugar.  Choose and prepare foods and drinks with less salt and added sugars. Use the nutrition information on food labels to help you make healthy choices. The percent daily value listed on the food label tells you whether a food is low or high in certain nutrients. A percent daily value of 5% or less means that the food is low in a nutrient. A percent daily value of 20% or more means that the food is high in a nutrient.         Choose healthy fats.  Choose healthy fats such as polyunsaturated and monounsaturated fats in place of unhealthy fats. Healthy fats are found in vegetable oils such as soybean, corn, canola, olive, and sunflower oil. Unhealthy fats are saturated fats, trans fats, and cholesterol. Unhealthy fats are found in shortening, butter, stick margarine, and animal fat.         Ask your healthcare provider if alcohol is okay for you.  Generally, men 65 or older and women should limit alcohol to 1 drink within 24 hours and 7 within 1 week. Men 21 to 64 years should limit alcohol to 2 drinks a day and 14 within 1 week. Your provider can tell you how many drinks are okay for you within 24 hours or within 1 week. A drink of alcohol is 12 ounces of beer, 5 ounces of wine, or 1½ ounces of liquor. Always have food when you drink alcohol. Your blood sugar may fall to a low level if you drink when your stomach is empty.    © Copyright [employer] 2023 Information is for End User's use only and  may not be sold, redistributed or otherwise used for commercial purposes.  The above information is an  only. It is not intended as medical advice for individual conditions or treatments. Talk to your doctor, nurse or pharmacist before following any medical regimen to see if it is safe and effective for you.  Meal Planning with Diabetes Exchanges   AMBULATORY CARE:   Diabetes exchanges  are servings of food that contain similar amounts of carbohydrate, fat, protein, and calories within a food group. The exchanges can be used to develop a healthy meal plan that helps to keep your blood sugar within the recommended levels. A meal plan with the right amount of carbohydrates is especially important. Your blood sugar naturally rises after you eat carbohydrates. Too many carbohydrates in 1 meal or snack can raise your blood sugar level. Carbohydrates are found in starches, fruit, milk, yogurt, and sweets.  Call your doctor or diabetes care provider if:   You have high blood sugar levels during a certain time of day, or almost all of the time.    You often have low blood sugar levels.    You have questions or concerns about your condition or care.    Create a meal plan with exchanges:  A dietitian will work with you to develop a healthy meal plan that is right for you. This meal plan will include the amount of exchanges you can have from each food group throughout the day. Follow your meal plan by keeping track of the amount of exchanges you eat for each meal and snack. Your meal plan will be based on your age, weight, blood sugar levels, medicine, and activity level.  Starch food group exchanges:  Each exchange below contains about 15 grams of carbohydrate , 3 grams of protein, 1 gram of fat, and 80 calories.  1 ounce of white, whole wheat or rye bread (1 slice)    1 ounce of bagel (about ¼ of a bagel)    1 6-inch flour or corn tortilla or 1 4-inch pancake (about ¼ inch thick)    ? cup of cooked pasta or  rice    ¾ cup of dry, ready-to-eat cereal with no sugar added    ½ cup of cooked cereal, such as oatmeal    3 dariana cracker squares or 8 animal crackers    6 saltine-type crackers    3 cups of popcorn or ¾ ounce of pretzels    Starchy vegetables and cooked legumes:      ½ cup of corn, green peas, sweet potatoes, or mashed potatoes    ¼ of a large baked potato    1 cup of acorn, butternut squash, or pumpkin    ½ cup of beans, lentils, or peas (such as green, kidney, or black-eyed)    ? cup of lima beans    Fruit group exchanges:  Each exchange contains about 15 grams of carbohydrate  and 60 calories.  1 small (4 ounce) apple, banana orange, or nectarine    ½ cup of canned or fresh fruit    ½ cup (4 ounces) of unsweetened fruit juice    2 tablespoons of dried fruit    Milk group exchanges:  Each exchange contains about 12 grams of carbohydrate  and 8 grams of protein. The amount of fat and calories in each serving depends on the type of milk (such as whole, low-fat, or fat-free).  1 cup fat-free or low-fat milk    ¾ cup of plain, nonfat yogurt    1 cup fat-free, flavored yogurt with artificial (no calorie) sweetener    Non-starchy vegetable group exchanges:  Each exchange contains about 5 grams of carbohydrate , 2 grams of protein, and 25 calories. Examples include beets, broccoli, cabbage, carrots, cauliflower, cucumber, mushrooms, tomatoes, and zucchini.  ½ cup of cooked vegetables or 1 cup of raw vegetables    ½ cup of vegetable juice    Meat and meat substitute group exchanges:  Each exchange of a lean meat  listed below contains about 7 grams of protein, 0 to 3 grams of fat, and 45 calories. The meat and meat substitutes food group does not contain any carbohydrates. Medium and high-fat meats have more calories.  1 ounce of chicken or turkey without skin, or 1 ounce of fish (not breaded or fried)    1 ounce of lean beef, pork, or lamb    1-inch cube or 1 ounce of low-fat cheese    2 egg whites or ¼ cup of egg  substitute    ½ cup of tofu    Sweets, desserts, and other carbohydrate group exchanges:   Sweets and other desserts:  Each exchange has about 15 grams of carbohydrate .    1 ounce of kathya food cake or 2-inch square cake (unfrosted)    2 small cookies    ½ cup of sugar-free, fat-free ice cream    1 tablespoon of syrup, jam, jelly, table sugar, or honey    Combination foods:     1 cup of an entrée, such as lasagna, spaghetti with meatballs, macaroni and cheese, and chili with beans (each serving counts as 2 carbohydrate exchanges )    1 cup of tomato or vegetable beef soup (each serving counts as 1 carbohydrate exchange )    Fat group exchanges:  Each exchange contains 5 grams of fat and 45 calories.  1 teaspoon of oil (such as canola, olive, or corn oil)    6 almonds or cashews, 10 peanuts, or 4 pecan halves    2 tablespoons of avocado    ½ tablespoon of peanut butter    1 teaspoon of regular margarine or 2 teaspoons of low-fat margarine    1 teaspoon of regular butter or 1 tablespoon of low-fat butter    1 teaspoon of regular mayonnaise or 1 tablespoon of low-fat mayonnaise    1 tablespoon of regular salad dressing or 2 tablespoons of low-fat salad dressing    Free foods:  The foods on this list are called free foods because they have very few calories. Free foods usually do not increase your blood sugar if you limit them.  1 tablespoon of catsup or taco sauce    ¼ cup of salsa    2 tablespoons of sugar-free syrup or 2 teaspoons of light jam or jelly    1 tablespoon of fat-free salad dressing    4 tablespoons of fat-free margarine or fat-free mayonnaise    Sugar-free drinks: diet soda, sugar-free drink mixes, or mineral water    Low-sodium bouillon or fat-free broth    Mustard    Seasonings such as spices, herbs, and garlic    Sugar-free gelatin without added fruit    Other healthy nutrition guidelines:   Limit drinks with sugar substitutes.  Your dietitian or healthcare provider will encourage you to drink water.  Water helps your kidneys to function properly. Ask how much water you should drink every day.    Eat more fiber.  Choose foods that are good sources of fiber, such as fruits, vegetables, and whole grains. Cereals that contain 5 or more grams of fiber per serving are good sources of fiber. Legumes such as garbanzo, green beans, kidney beans, and lentils are also good sources.         Limit fat.  Ask your dietitian or healthcare provider how much fat you should eat each day. Choose foods low in fat, saturated fat, trans fat, and cholesterol. Examples include turkey or chicken without the skin, fish, lean cuts of meat, and beans. Low-fat dairy foods, such as low-fat or fat-free milk and low-fat yogurt are also good choices. Omega-3 fatty acids are healthy fats that are found in canola oil, soybean oil and fatty fish. [place], albacore tuna, and sardines are good sources of omega 3 fatty acids. Eat 2 servings of these types of fish each week. Do not eat fried fish.         Limit sugar.  Sugar and sweets must be counted toward the carbohydrate exchanges that you can have within your meal plan. Limit sugar and sweets because they are usually also high in calories and fat. Eat smaller portions of sweets by sharing a dessert or asking for a child-size portion at a restaurant.    Limit sodium  (salt) to about 2,300 mg per day. You may need to eat even less sodium if you have certain medical conditions. Foods high in sodium include soy sauce, potato chips, and soup.         Limit alcohol.  Ask your healthcare provider if it is safe for you to drink alcohol. If alcohol is safe for you to have, eat a meal when you drink alcohol. If you drink alcohol on an empty stomach, your blood sugar may drop to a low level. Women 21 years or older and men 65 years or older should limit alcohol to 1 drink a day. Men aged 21 to 64 years should limit alcohol to 2 drinks a day. A drink of alcohol is 5 ounces of wine, 12 ounces of beer, or 1½  ounces of liquor.    Other ways to manage diabetes:   Control your blood sugar level.  Test your blood sugar level regularly and keep a record of the results. Ask your healthcare provider when and how often to test your blood sugar. You may need to check your blood sugar level at least 3 times each day.    Talk to your healthcare provider about your weight.  Ask if you need to lose weight, and how much you need to lose. If you are overweight, you may need to make other changes to lose weight. Ask your healthcare provider to help you create a weight loss program.    Get regular physical activity.  Physical activity can help decrease your blood sugar level. It can also help to decrease your risk for heart disease and help you lose weight. Adults should have moderate intensity physical activity for at least 150 minutes every week. Spread the amount of activity over at least 3 days a week. Do not skip more than 2 days in a row. Children should get at least 60 minutes of moderate physical activity on most days of the week. Examples of moderate physical activity include brisk walking, running, and swimming. Do not sit for longer than 30 minutes. Work with your healthcare provider to create a plan for physical activity.       © Copyright [employer] 2023 Information is for End User's use only and may not be sold, redistributed or otherwise used for commercial purposes.  The above information is an  only. It is not intended as medical advice for individual conditions or treatments. Talk to your doctor, nurse or pharmacist before following any medical regimen to see if it is safe and effective for you.

## 2024-01-18 NOTE — PROGRESS NOTES
Diabetic Foot Exam    Patient's shoes and socks removed.    Right Foot/Ankle   Right Foot Inspection  Skin Exam: skin normal and skin intact. No dry skin, no warmth, no callus, no erythema, no maceration, no abnormal color, no pre-ulcer, no ulcer and no callus.     Sensory   Monofilament testing: intact    Left Foot/Ankle  Left Foot Inspection  Skin Exam: skin normal and skin intact. No dry skin, no warmth, no erythema, no maceration, normal color, no pre-ulcer, no ulcer and no callus.     Sensory   Monofilament testing: intact

## 2024-01-26 ENCOUNTER — PATIENT OUTREACH (OUTPATIENT)
Dept: INTERNAL MEDICINE CLINIC | Facility: CLINIC | Age: 39
End: 2024-01-26

## 2024-01-26 NOTE — PROGRESS NOTES
Request received from PCP for Outpatient Social Work Care Manager (OP SWCM) to outreach patient and assist with health insurance coverage needs and cost of insulin.    Per chart review, patient has no health insurance.    Call placed to patient to further assess needs.  No answer, voicemail left, and awaiting return call.

## 2024-02-02 ENCOUNTER — PATIENT OUTREACH (OUTPATIENT)
Dept: INTERNAL MEDICINE CLINIC | Facility: CLINIC | Age: 39
End: 2024-02-02

## 2024-02-02 NOTE — PROGRESS NOTES
2nd outreach attempt to patient to assist with health insurance coverage needs and cost of insulin.     Per chart review, patient has no health insurance.    Call answered but patient was not home.  Requested call back around 12PM NOON.

## 2024-02-02 NOTE — LETTER
[address]  [place] [address]  [phone]    Re: Unable to Reach   2/2/2024       Dear Benitez,    I am a [facility]  and wanted to be certain you had information to contact me should you desire assistance with or have questions about non-medical aspects of your care such as [but not limited to] medical insurance, housing, transportation, material needs, or emergency needs, as I was unable to reach you.  If I do not have an answer I will assist you in finding the appropriate agency or individual who can help.      Please feel free to contact me at [phone]. Thank You.    Sincerely,         Hannah Parks

## 2024-02-02 NOTE — PROGRESS NOTES
Additional call placed to patient around 1:30PM to assess need for health insurance coverage and address concern for cost of insulin.    No answer, voicemail left, and awaiting return call.    Will send Unable to Reach letter to patient via [employer].  Will close if no return call received.

## 2024-02-07 ENCOUNTER — PATIENT OUTREACH (OUTPATIENT)
Dept: INTERNAL MEDICINE CLINIC | Facility: CLINIC | Age: 39
End: 2024-02-07

## 2024-02-07 NOTE — PROGRESS NOTES
No return call received from patient since sending Unable to Reach letter. Will close case at this time but will remain available to assist with future needs.

## 2024-02-21 PROBLEM — E11.10 DKA (DIABETIC KETOACIDOSIS) (HCC): Status: RESOLVED | Noted: 2023-12-31 | Resolved: 2024-02-21

## 2024-09-22 ENCOUNTER — HOSPITAL ENCOUNTER (EMERGENCY)
Facility: HOSPITAL | Age: 39
Discharge: HOME | End: 2024-09-22
Attending: EMERGENCY MEDICINE

## 2024-09-22 VITALS
DIASTOLIC BLOOD PRESSURE: 133 MMHG | HEART RATE: 93 BPM | SYSTOLIC BLOOD PRESSURE: 185 MMHG | RESPIRATION RATE: 18 BRPM | OXYGEN SATURATION: 99 % | TEMPERATURE: 98.3 F

## 2024-09-22 DIAGNOSIS — J06.9 URI (UPPER RESPIRATORY INFECTION): Primary | ICD-10-CM

## 2024-09-22 PROCEDURE — 99284 EMERGENCY DEPT VISIT MOD MDM: CPT | Performed by: EMERGENCY MEDICINE

## 2024-09-22 PROCEDURE — 99284 EMERGENCY DEPT VISIT MOD MDM: CPT

## 2024-09-22 RX ORDER — IBUPROFEN 600 MG/1
600 TABLET, FILM COATED ORAL ONCE
Status: COMPLETED | OUTPATIENT
Start: 2024-09-22 | End: 2024-09-22

## 2024-09-22 RX ORDER — ACETAMINOPHEN 325 MG/1
975 TABLET ORAL ONCE
Status: COMPLETED | OUTPATIENT
Start: 2024-09-22 | End: 2024-09-22

## 2024-09-22 RX ORDER — OXYMETAZOLINE HYDROCHLORIDE 0.05 G/100ML
2 SPRAY NASAL ONCE
Status: COMPLETED | OUTPATIENT
Start: 2024-09-22 | End: 2024-09-22

## 2024-09-22 RX ADMIN — ACETAMINOPHEN 975 MG: 325 TABLET ORAL at 12:34

## 2024-09-22 RX ADMIN — OXYMETAZOLINE HYDROCHLORIDE 2 SPRAY: 0.05 SPRAY NASAL at 12:34

## 2024-09-22 RX ADMIN — IBUPROFEN 600 MG: 600 TABLET, FILM COATED ORAL at 12:34

## 2024-09-22 NOTE — ED PROVIDER NOTES
1. URI (upper respiratory infection)      ED Disposition       ED Disposition   Discharge    Condition   Stable    Date/Time   Sun Sep 22, 2024 12:28 PM    Comment   Benitez White discharge to home/self care.                   Assessment & Plan       Medical Decision Making  Patient is a 39 y.o. male with PMH of nothing pertinent who presents to the ED with complaint of cold symptoms.    Vital signs on arrival blood pressure elevated 185/133, otherwise within normal limits. On exam patient is well-appearing, alert, oriented, no evidence respiratory distress, displays upper respiratory nasal congestion, otherwise physical exam is benign.    History and physical exam most consistent with common cold. However, differential diagnosis included but not limited to viral syndrome. Plan symptomatic treatment, Motrin/Tylenol/Afrin    View ED course above for further discussion on patient workup.     All labs reviewed and utilized in the medical decision making process  All radiology studies independently viewed by me and interpreted by the radiologist.  I reviewed all testing with the patient.     Upon re-evaluation after administration of Motrin/Tylenol/Afrin patient remains hemodynamically stable no new symptom/complaint, improvement in symptoms after administration of medications, work note provided, return precautions given, PCP follow-up recommended.       Risk  OTC drugs.  Prescription drug management.                     Medications   oxymetazoline (AFRIN) 0.05 % nasal spray 2 spray (2 sprays Each Nare Given 9/22/24 1234)   ibuprofen (MOTRIN) tablet 600 mg (600 mg Oral Given 9/22/24 1234)   acetaminophen (TYLENOL) tablet 975 mg (975 mg Oral Given 9/22/24 1234)       History of Present Illness       Patient is a 39-year-old male with past medical history presents emergency department complaining of viral symptoms.  Reports onset of viral symptoms yesterday, reports he was home, felt too fatigued at home to present to the  emergency department/work, reports that this morning he woke up, feels much improved, wanted to come the emergency department because he would like to obtain a work note, because he does not feel that he would be in good shape to go to work today, feels that if he could get 1 more day off he would be better served by going to work tomorrow after resting today.  Reports that he has congestion in his nose, is having x 1 episode of diarrhea yesterday with a normal bowel movement today, fatigue which feels much improved today, reports no chest pain or shortness of breath, no abdominal pain nausea or vomiting, no headaches/sore throat/cough.  Denies desire for COVID flu test.         Review of Systems        Objective     ED Triage Vitals [09/22/24 1141]   Temperature Pulse Blood Pressure Respirations SpO2 Patient Position - Orthostatic VS   98.3 °F (36.8 °C) 93 (!) 185/133 18 99 % Sitting      Temp Source Heart Rate Source BP Location FiO2 (%) Pain Score    Temporal Monitor Right arm -- 8        Physical Exam  Vitals and nursing note reviewed.   Constitutional:       General: He is not in acute distress.     Appearance: He is well-developed.   HENT:      Head: Normocephalic and atraumatic.      Nose: Congestion present. No rhinorrhea.   Eyes:      Conjunctiva/sclera: Conjunctivae normal.   Cardiovascular:      Rate and Rhythm: Normal rate and regular rhythm.      Heart sounds: No murmur heard.  Pulmonary:      Effort: Pulmonary effort is normal. No respiratory distress.      Breath sounds: Normal breath sounds.   Abdominal:      Palpations: Abdomen is soft.      Tenderness: There is no abdominal tenderness. There is no guarding or rebound.   Musculoskeletal:         General: No swelling.      Cervical back: Neck supple.   Skin:     General: Skin is warm and dry.      Capillary Refill: Capillary refill takes less than 2 seconds.   Neurological:      Mental Status: He is alert.   Psychiatric:         Mood and Affect: Mood  "normal.         Labs Reviewed - No data to display  No orders to display       Procedures    ED Medication and Procedure Management   Prior to Admission Medications   Prescriptions Last Dose Informant Patient Reported? Taking?   Alcohol Swabs 70 % PADS   No No   Sig: May substitute brand based on insurance coverage. Check glucose TID.   Blood Glucose Monitoring Suppl (OneTouch Verio Reflect) w/Device KIT  Self No No   Sig: May substitute brand based on insurance coverage. Check glucose TID.   Insulin Syringe-Needle U-100 (BD Insulin Syringe U/F) 31G X 5/16\" 0.3 ML MISC  Self No No   Sig: For use with insulin. Pharmacy may dispense brand covered by insurance.   OneTouch Delica Lancets 33G MISC   No No   Sig: May substitute brand based on insurance coverage. Check glucose TID.   atorvastatin (LIPITOR) 20 mg tablet   No No   Sig: Take 1 tablet (20 mg total) by mouth every evening   glucose blood (OneTouch Verio) test strip   No No   Sig: May substitute brand based on insurance coverage. Check glucose TID.   insulin NPH (NovoLIN N) 100 Units/mL subcutaneous injection  Self No No   Sig: Inject 30 Units under the skin 2 (two) times a day before meals   lisinopril (ZESTRIL) 5 mg tablet   No No   Sig: Take 1 tablet (5 mg total) by mouth daily      Facility-Administered Medications: None     Discharge Medication List as of 9/22/2024 12:28 PM        CONTINUE these medications which have NOT CHANGED    Details   Alcohol Swabs 70 % PADS May substitute brand based on insurance coverage. Check glucose TID., Normal      atorvastatin (LIPITOR) 20 mg tablet Take 1 tablet (20 mg total) by mouth every evening, Starting Thu 1/18/2024, Until Tue 7/16/2024, Normal      Blood Glucose Monitoring Suppl (OneTouch Verio Reflect) w/Device KIT May substitute brand based on insurance coverage. Check glucose TID., Normal      glucose blood (OneTouch Verio) test strip May substitute brand based on insurance coverage. Check glucose TID., Normal    " "  insulin NPH (NovoLIN N) 100 Units/mL subcutaneous injection Inject 30 Units under the skin 2 (two) times a day before meals, Starting Tue 1/2/2024, Normal      Insulin Syringe-Needle U-100 (BD Insulin Syringe U/F) 31G X 5/16\" 0.3 ML MISC For use with insulin. Pharmacy may dispense brand covered by insurance., Normal      lisinopril (ZESTRIL) 5 mg tablet Take 1 tablet (5 mg total) by mouth daily, Starting Thu 1/18/2024, Normal      OneTouch Delica Lancets 33G MISC May substitute brand based on insurance coverage. Check glucose TID., Normal           No discharge procedures on file.     Aren Oscar,   09/22/24 1243    "

## 2024-09-22 NOTE — Clinical Note
Benitez White was seen and treated in our emergency department on 9/22/2024.    No restrictions            Diagnosis: Viral URI    Benitez  may return to work on return date.    He may return on this date: 09/23/2024    Pt has been dealing w/ reported symptoms consistent with a viral Upper Respiratory Illness since 9/21/24, seen in the emergency department 9/22/24 w/ symptoms consistent w/ a viral illness, may return to work on the date specified     If you have any questions or concerns, please don't hesitate to call.      Aren Oscar, DO    ______________________________           _______________          _______________  Hospital Representative                              Date                                Time

## 2024-09-22 NOTE — DISCHARGE INSTRUCTIONS
Please return to the emergency department if you develop new or worsening symptoms including fever, chest pain, shortness of breath, nausea and vomiting that does not resolve on its own.    Please follow-up with your primary care provider for additional care and management of your viral symptoms.     Please continue to take your home medications as prescribed, please take Motrin and Tylenol as needed for cough / cold.

## 2024-09-22 NOTE — ED ATTENDING ATTESTATION
9/22/2024  I, Billy Bateman MD, saw and evaluated the patient. I have discussed the patient with the resident/non-physician practitioner and agree with the resident's/non-physician practitioner's findings, Plan of Care, and MDM as documented in the resident's/non-physician practitioner's note, except where noted. All available labs and Radiology studies were reviewed.  I was present for key portions of any procedure(s) performed by the resident/non-physician practitioner and I was immediately available to provide assistance.       At this point I agree with the current assessment done in the Emergency Department.  I have conducted an independent evaluation of this patient a history and physical is as follows:    39-year-old man presenting with URI symptoms.  No chest pain or shortness of breath.  He is requesting a work note.  On exam patient awake and alert no acute distress.  Oropharynx clear.  Neck supple with no adenopathy.  Heart regular rate and rhythm, no murmurs rubs or gallops.  Lungs clear to auscultation bilaterally.  Skin warm and dry.  Will provide symptomatic care and work note.  Patient given return precautions.    ED Course         Critical Care Time  Procedures

## 2024-09-29 ENCOUNTER — HOSPITAL ENCOUNTER (EMERGENCY)
Facility: HOSPITAL | Age: 39
Discharge: HOME | End: 2024-09-29

## 2024-09-29 VITALS
RESPIRATION RATE: 16 BRPM | DIASTOLIC BLOOD PRESSURE: 101 MMHG | OXYGEN SATURATION: 100 % | SYSTOLIC BLOOD PRESSURE: 182 MMHG | HEART RATE: 62 BPM | TEMPERATURE: 98.2 F

## 2024-09-29 DIAGNOSIS — A05.9 FOOD POISONING: Primary | ICD-10-CM

## 2024-09-29 PROCEDURE — 99283 EMERGENCY DEPT VISIT LOW MDM: CPT

## 2024-09-29 RX ORDER — ONDANSETRON 2 MG/ML
4 INJECTION INTRAMUSCULAR; INTRAVENOUS ONCE
Status: DISCONTINUED | OUTPATIENT
Start: 2024-09-29 | End: 2024-09-29

## 2024-09-29 RX ORDER — MAGNESIUM HYDROXIDE/ALUMINUM HYDROXICE/SIMETHICONE 120; 1200; 1200 MG/30ML; MG/30ML; MG/30ML
30 SUSPENSION ORAL ONCE
Status: COMPLETED | OUTPATIENT
Start: 2024-09-29 | End: 2024-09-29

## 2024-09-29 RX ORDER — LOPERAMIDE HCL 2 MG
2 CAPSULE ORAL 4 TIMES DAILY PRN
Qty: 12 CAPSULE | Refills: 0 | Status: SHIPPED | OUTPATIENT
Start: 2024-09-29

## 2024-09-29 RX ORDER — ALUMINUM HYDROXIDE, MAGNESIUM HYDROXIDE, SIMETHICONE 400; 400; 40 MG/10ML; MG/10ML; MG/10ML
30 SUSPENSION ORAL
Qty: 355 ML | Refills: 0 | Status: SHIPPED | OUTPATIENT
Start: 2024-09-29

## 2024-09-29 RX ORDER — LOPERAMIDE HCL 2 MG
2 CAPSULE ORAL ONCE
Status: COMPLETED | OUTPATIENT
Start: 2024-09-29 | End: 2024-09-29

## 2024-09-29 RX ADMIN — LOPERAMIDE HYDROCHLORIDE 2 MG: 2 CAPSULE ORAL at 09:40

## 2024-09-29 RX ADMIN — ALUMINUM HYDROXIDE, MAGNESIUM HYDROXIDE, AND DIMETHICONE 30 ML: 200; 20; 200 SUSPENSION ORAL at 09:44

## 2024-09-29 NOTE — ED PROVIDER NOTES
Final diagnoses:   Food poisoning     ED Disposition       ED Disposition   Discharge    Condition   Stable    Date/Time   Sun Sep 29, 2024  9:39 AM    Comment   Benitez White discharge to home/self care.                   Assessment & Plan       Medical Decision Making  39M presenting with likely food poisoning. Offered labs to evaluate for abdominal organ injury, leukocytosis. Patient noting symptoms have improved, feels he can manage at home but needs note for work. Patient given symptomatic medication in ED, tolerated PO intake, and appropriate for d/c with outpatient f/u.     Risk  OTC drugs.  Prescription drug management.             Medications   loperamide (IMODIUM) capsule 2 mg (2 mg Oral Given 9/29/24 9110)   aluminum-magnesium hydroxide-simethicone (MAALOX) oral suspension 30 mL (30 mL Oral Given 9/29/24 9800)       ED Risk Strat Scores                                               History of Present Illness       Chief Complaint   Patient presents with    Diarrhea     Pt presents to the ED with diffuse abd pain and diarrhea. N/V/D. Onset last night.        Past Medical History:   Diagnosis Date    Diabetes mellitus (HCC)     Hypertension       No past surgical history on file.   No family history on file.   Social History     Tobacco Use    Smoking status: Former     Types: Cigarettes    Smokeless tobacco: Current   Vaping Use    Vaping status: Never Used   Substance Use Topics    Alcohol use: Not Currently    Drug use: Yes     Types: Marijuana      E-Cigarette/Vaping    E-Cigarette Use Never User       E-Cigarette/Vaping Substances      I have reviewed and agree with the history as documented.     39M w/ h/o DM, prior hospitalization for DKA, presents to the ED due to diarrhea. Last night, ate a rare steak and a couple hours later he developed generalized abd pain described as dull/achy. Since then has had 6-7 episodes of tan colored watery diarrhea. 3x emesis that was NBNB, no emesis today. Feels  nauseous. Notes no abd pain at this time. No recent travel/abx. No uri, urinary, or other complaints. Bgs have been in 80s-90s.         Review of Systems   All other systems reviewed and are negative.          Objective       ED Triage Vitals [09/29/24 0912]   Temperature Pulse Blood Pressure Respirations SpO2 Patient Position - Orthostatic VS   98.2 °F (36.8 °C) 62 (!) 182/101 16 100 % Sitting      Temp Source Heart Rate Source BP Location FiO2 (%) Pain Score    Oral Monitor Right arm -- 4      Vitals      Date and Time Temp Pulse SpO2 Resp BP Pain Score FACES Pain Rating User   09/29/24 0912 98.2 °F (36.8 °C) 62 100 % 16 182/101 4 -- HVL            Physical Exam  Vitals and nursing note reviewed.   Constitutional:       General: He is not in acute distress.     Appearance: He is well-developed.   HENT:      Head: Normocephalic and atraumatic.   Eyes:      Conjunctiva/sclera: Conjunctivae normal.   Cardiovascular:      Rate and Rhythm: Normal rate and regular rhythm.      Heart sounds: No murmur heard.  Pulmonary:      Effort: Pulmonary effort is normal. No respiratory distress.      Breath sounds: Normal breath sounds.   Abdominal:      Palpations: Abdomen is soft.      Tenderness: There is no abdominal tenderness.   Musculoskeletal:         General: No swelling.      Cervical back: Neck supple.   Skin:     General: Skin is warm and dry.      Capillary Refill: Capillary refill takes less than 2 seconds.   Neurological:      Mental Status: He is alert.   Psychiatric:         Mood and Affect: Mood normal.         Results Reviewed       None            No orders to display       Procedures    ED Medication and Procedure Management   Prior to Admission Medications   Prescriptions Last Dose Informant Patient Reported? Taking?   Alcohol Swabs 70 % PADS   No No   Sig: May substitute brand based on insurance coverage. Check glucose TID.   Blood Glucose Monitoring Suppl (OneTouch Verio Reflect) w/Device KIT  Self No No  "  Sig: May substitute brand based on insurance coverage. Check glucose TID.   Insulin Syringe-Needle U-100 (BD Insulin Syringe U/F) 31G X 5/16\" 0.3 ML MISC  Self No No   Sig: For use with insulin. Pharmacy may dispense brand covered by insurance.   OneTouch Delica Lancets 33G MISC   No No   Sig: May substitute brand based on insurance coverage. Check glucose TID.   atorvastatin (LIPITOR) 20 mg tablet   No No   Sig: Take 1 tablet (20 mg total) by mouth every evening   glucose blood (OneTouch Verio) test strip   No No   Sig: May substitute brand based on insurance coverage. Check glucose TID.   insulin NPH (NovoLIN N) 100 Units/mL subcutaneous injection  Self No No   Sig: Inject 30 Units under the skin 2 (two) times a day before meals   lisinopril (ZESTRIL) 5 mg tablet   No No   Sig: Take 1 tablet (5 mg total) by mouth daily      Facility-Administered Medications: None     Discharge Medication List as of 9/29/2024  9:39 AM        START taking these medications    Details   aluminum-magnesium hydroxide-simethicone (MAALOX) 200-200-20 MG/5ML SUSP Take 30 mL by mouth 4 (four) times a day (before meals and at bedtime), Starting Sun 9/29/2024, Normal      loperamide (IMODIUM) 2 mg capsule Take 1 capsule (2 mg total) by mouth 4 (four) times a day as needed for diarrhea, Starting Sun 9/29/2024, Normal           CONTINUE these medications which have NOT CHANGED    Details   Alcohol Swabs 70 % PADS May substitute brand based on insurance coverage. Check glucose TID., Normal      atorvastatin (LIPITOR) 20 mg tablet Take 1 tablet (20 mg total) by mouth every evening, Starting Thu 1/18/2024, Until Tue 7/16/2024, Normal      Blood Glucose Monitoring Suppl (OneTouch Verio Reflect) w/Device KIT May substitute brand based on insurance coverage. Check glucose TID., Normal      glucose blood (OneTouch Verio) test strip May substitute brand based on insurance coverage. Check glucose TID., Normal      insulin NPH (NovoLIN N) 100 Units/mL " "subcutaneous injection Inject 30 Units under the skin 2 (two) times a day before meals, Starting Tue 1/2/2024, Normal      Insulin Syringe-Needle U-100 (BD Insulin Syringe U/F) 31G X 5/16\" 0.3 ML MISC For use with insulin. Pharmacy may dispense brand covered by insurance., Normal      lisinopril (ZESTRIL) 5 mg tablet Take 1 tablet (5 mg total) by mouth daily, Starting Thu 1/18/2024, Normal      OneTouch Delica Lancets 33G MISC May substitute brand based on insurance coverage. Check glucose TID., Normal           No discharge procedures on file.  ED SEPSIS DOCUMENTATION   Time reflects when diagnosis was documented in both MDM as applicable and the Disposition within this note       Time User Action Codes Description Comment    9/29/2024  9:39 AM Lamont Childress Add [A05.9] Food poisoning                  Lamont Childress MD  10/01/24 1819    "

## 2024-09-29 NOTE — Clinical Note
Benitez White was seen and treated in our emergency department on 9/29/2024.                Diagnosis: acute gastroenteritis    Benietz  .    He may return on this date: 09/30/2024         If you have any questions or concerns, please don't hesitate to call.      Lamont Childress MD    ______________________________           _______________          _______________  Hospital Representative                              Date                                Time

## 2024-09-29 NOTE — Clinical Note
Benitez White was seen and treated in our emergency department on 9/29/2024.                Diagnosis:     Benitez  .    He may return on this date: 09/30/2024         If you have any questions or concerns, please don't hesitate to call.      Lamont Childress MD    ______________________________           _______________          _______________  Hospital Representative                              Date                                Time

## 2024-09-30 ENCOUNTER — TELEPHONE (OUTPATIENT)
Dept: INTERNAL MEDICINE CLINIC | Facility: CLINIC | Age: 39
End: 2024-09-30

## 2025-03-10 NOTE — ED ATTENDING ATTESTATION
10/30/2023  Marcos Runner, MD, saw and evaluated the patient. I have discussed the patient with the resident/non-physician practitioner and agree with the resident's/non-physician practitioner's findings, Plan of Care, and MDM as documented in the resident's/non-physician practitioner's note, except where noted. All available labs and Radiology studies were reviewed. I was present for key portions of any procedure(s) performed by the resident/non-physician practitioner and I was immediately available to provide assistance. At this point I agree with the current assessment done in the Emergency Department. I have conducted an independent evaluation of this patient a history and physical is as follows:  Pt was moving bed then yesterday flared with back pain over total back no weakness no numbness no b and b no fevers no ivda Pt has pain with bending PE: alert pain with movement only mild tenderness L lateral back  motor 5/5 sensation wnl MDM: will treat pain refer comprehensive spine    BP pt states he has history of htn but is not on meds by choice would like to target using diet and exercise .    Referred to primary care  ED Course         Critical Care Time  Procedures 3

## 2025-03-20 ENCOUNTER — TELEPHONE (OUTPATIENT)
Age: 40
End: 2025-03-20

## 2025-03-30 NOTE — TELEPHONE ENCOUNTER
03/29/25 11:18 PM        The office's request has been received, reviewed, and the patient chart updated. The PCP has successfully been removed with a patient attribution note. This message will now be completed.        Thank you  Adam Blas